# Patient Record
Sex: FEMALE | Race: WHITE | Employment: UNEMPLOYED | URBAN - METROPOLITAN AREA
[De-identification: names, ages, dates, MRNs, and addresses within clinical notes are randomized per-mention and may not be internally consistent; named-entity substitution may affect disease eponyms.]

---

## 2017-12-13 ENCOUNTER — OFFICE VISIT (OUTPATIENT)
Dept: OBGYN CLINIC | Age: 22
End: 2017-12-13

## 2017-12-13 VITALS
WEIGHT: 189.8 LBS | SYSTOLIC BLOOD PRESSURE: 120 MMHG | BODY MASS INDEX: 32.4 KG/M2 | DIASTOLIC BLOOD PRESSURE: 80 MMHG | HEIGHT: 64 IN

## 2017-12-13 DIAGNOSIS — R10.2 PELVIC PAIN: Primary | ICD-10-CM

## 2017-12-13 DIAGNOSIS — N83.201 CYST OF RIGHT OVARY: ICD-10-CM

## 2017-12-13 LAB
HCG URINE, QL. (POC): NEGATIVE
VALID INTERNAL CONTROL?: YES

## 2017-12-13 RX ORDER — IBUPROFEN 800 MG/1
800 TABLET ORAL
Qty: 60 TAB | Refills: 3 | Status: SHIPPED | OUTPATIENT
Start: 2017-12-13 | End: 2018-08-30 | Stop reason: ALTCHOICE

## 2017-12-13 RX ORDER — TRAMADOL HYDROCHLORIDE 50 MG/1
50 TABLET ORAL
Qty: 15 TAB | Refills: 0 | Status: SHIPPED | OUTPATIENT
Start: 2017-12-13 | End: 2018-08-30 | Stop reason: ALTCHOICE

## 2017-12-13 NOTE — PROGRESS NOTES
Pelvic Pain evaluation    Roxanna Reyes is a 23 y.o. female who complains of pelvic pain. The pain is described as pressure, and is 5/10 in intensity. Pain is located in the suprapubic without radiation. The pain started 3 days ago. Her symptoms have been unchanged since. Aggravating factors: none. Alleviating factors: none. Associated symptoms: severe pain with intercourse x 2 weeks ago. The patient denies nausea and vomiting. Pt is sexually active, uses the Rhythm method for birth control when needed, wants to conceive- pt unsure when cycle should start - possibility of pregnancy. No past medical history on file. No past surgical history on file. Social History     Occupational History    Not on file. Social History Main Topics    Smoking status: Not on file    Smokeless tobacco: Not on file    Alcohol use Not on file    Drug use: Not on file    Sexual activity: Not on file     No family history on file.     Allergies not on file  Prior to Admission medications    Not on File        Review of Systems: History obtained from the patient  Constitutional: negative for weight loss, fever, night sweats  Breast: negative for breast lumps, nipple discharge, galactorrhea  GI: negative for change in bowel habits, abdominal pain, black or bloody stools  : negative for frequency, dysuria, hematuria, vaginal discharge- pain 3 days ago debilitating per pt  MSK: negative for back pain, joint pain, muscle pain  Skin: negative for itching, rash, hives  Psych: negative for anxiety, depression, change in mood      Objective:      Physical Exam:     Constitutional  · Appearance: well-nourished, well developed, alert, no acute distress - guarding lower abd    Gastrointestinal  · Abdominal Examination: abdomen, no masses present- RLQ/symphis tenderness with palpation  · Liver and spleen: no hepatomegaly present, spleen not palpable  · Hernias: no hernias identified    Genitourinary  · External Genitalia: normal appearance for age, no discharge present, no tenderness present, no inflammatory lesions present, no masses present, no atrophy present  · Vagina: normal vaginal vault without central or paravaginal defects, no discharge present, no inflammatory lesions present, no masses present  · Bladder: non-tender to palpation  · Urethra: appears normal  · Cervix: mild/moderate CMT   · Uterus: normal size, shape and consistency  · Adnexa: 7/10 adnexal tenderness with bimanual  · Perineum: perineum within normal limits, no evidence of trauma, no rashes or skin lesions present  · Anus: anus within normal limits, no hemorrhoids present  · Inguinal Lymph Nodes: no lymphadenopathy present    Skin  · General Inspection: no rash, no lesions identified    Neurologic/Psychiatric  · Mental Status:  · Orientation: grossly oriented to person, place and time  · Mood and Affect: mood normal, affect appropriate    Pregnancy test in office negaitve    Sonogram -   Report per sono tech    THE UTERUS IS ANTEVERTED, NORMAL IN SIZE AND ECHOGENICITY. THE ENDOMETRIUM MEASURES 6-7MM IN THICKNESS. NO MASSES OR ABNORMALITIES ARE SEEN. RIGHT OVARY APPEARS WNL. THERE APPEARS TO BE A COLLAPSED FOLLICLE WITH BLOODFLOW IN THE  PERIPHERY. THIS MAY REPRESENT A COLLAPSED HEMORRHAGIC CYST. LEFT OVARY APPEARS WNL. NO FREE FLUID IS SEEN IN THE CDS.     Assessment/Plan:  Negative pregnancy test  Hemorraghic Right ovarian cyst- appears to be resolving -   Management options reviewed- BCP for ovulation control- pt would prefer not to start birth control, wanting to conceive  Conservative manamgement- motrin for discomfort- 15 tramadol given for severe pain- if pain perists, fever develops, or pain is recurrent pt will call back     Vlad Lawrence CNM

## 2017-12-13 NOTE — PATIENT INSTRUCTIONS
Pelvic Pain: Care Instructions  Your Care Instructions    Pelvic pain, or pain in the lower belly, can have many causes. Often pelvic pain is not serious and gets better in a few days. If your pain continues or gets worse, you may need tests and treatment. Tell your doctor about any new symptoms. These may be signs of a serious problem. Follow-up care is a key part of your treatment and safety. Be sure to make and go to all appointments, and call your doctor if you are having problems. It's also a good idea to know your test results and keep a list of the medicines you take. How can you care for yourself at home? · Rest until you feel better. Lie down, and raise your legs by placing a pillow under your knees. · Drink plenty of fluids. You may find that small, frequent sips are easier on your stomach than if you drink a lot at once. Avoid drinks with carbonation or caffeine, such as soda pop, tea, or coffee. · Try eating several small meals instead of 2 or 3 large ones. Eat mild foods, such as rice, dry toast or crackers, bananas, and applesauce. Avoid fatty and spicy foods, other fruits, and alcohol until 48 hours after your symptoms have gone away. · Take an over-the-counter pain medicine, such as acetaminophen (Tylenol), ibuprofen (Advil, Motrin), or naproxen (Aleve). Read and follow all instructions on the label. · Do not take two or more pain medicines at the same time unless the doctor told you to. Many pain medicines have acetaminophen, which is Tylenol. Too much acetaminophen (Tylenol) can be harmful. · You can put a heating pad, a warm cloth, or moist heat on your belly to relieve pain. When should you call for help? Call your doctor now or seek immediate medical care if:  · You have a new or higher fever. · You have unusual vaginal bleeding. · You have new or worse belly or pelvic pain. · You have vaginal discharge that has increased in amount or smells bad.   Watch closely for changes in your health, and be sure to contact your doctor if:  · You do not get better as expected. Where can you learn more? Go to http://fabiano-rkis.info/. Enter [phone] in the search box to learn more about \"Pelvic Pain: Care Instructions. \"  Current as of: October 13, 2016  Content Version: 11.4  © 6416-4451 [employer]. Care instructions adapted under license by [employer] (which disclaims liability or warranty for this information). If you have questions about a medical condition or this instruction, always ask your healthcare professional. [place] [address] any warranty or liability for your use of this information.

## 2018-04-04 NOTE — TELEPHONE ENCOUNTER
Attempted to contact patient regarding prescriptions. Rx Zofran 4 mg ODT #30, 1 RF and Diclegis #120, 1 RF unable to be sent to pharmacy. No pharmacy listed in chart. Medications have been approved by Armando Segura CNM. Unable to leave a message on voicemail, just states unavailable.

## 2018-04-09 ENCOUNTER — TELEPHONE (OUTPATIENT)
Dept: OBGYN CLINIC | Age: 23
End: 2018-04-09

## 2018-04-09 RX ORDER — DOXYLAMINE SUCCINATE AND PYRIDOXINE HYDROCHLORIDE, DELAYED RELEASE TABLETS 10 MG/10 MG 10; 10 MG/1; MG/1
2 TABLET, DELAYED RELEASE ORAL
Qty: 120 TAB | Refills: 0 | Status: SHIPPED | OUTPATIENT
Start: 2018-04-09 | End: 2018-08-30 | Stop reason: ALTCHOICE

## 2018-04-09 RX ORDER — ONDANSETRON 4 MG/1
4 TABLET, ORALLY DISINTEGRATING ORAL
Qty: 30 TAB | Refills: 0 | Status: SHIPPED | OUTPATIENT
Start: 2018-04-09 | End: 2018-05-29 | Stop reason: SDUPTHER

## 2018-04-09 NOTE — TELEPHONE ENCOUNTER
Patient is calling back because she did not get her rx sent to pharmacy as requested, apparently we did not have proper pharmacy information or her contact information. Attempted to contact patient regarding prescriptions. Rx Zofran 4 mg ODT #30, 1 RF and Diclegis #120, 1 RF unable to be sent to pharmacy. No pharmacy listed in chart. Medications have been approved by Effie Sloan CNM. Unable to leave a message on voicemail, just states unavailable. Patient is schedule for EOB 4-18-18 1:30 with [address] has been selected now in computer. I do not have specific directions given by [place] on how often to give. Please send in or verify for full directions. Thanks.

## 2018-04-09 NOTE — TELEPHONE ENCOUNTER
FRANKIE Weaver, LPN        Caller: Unspecified (Today, 12:52 PM)                     You can let her know I sent these to her Pharmacy. They should be ready by this afternoon.  Thanks, Davidson Rang            Previous Messages

## 2018-04-18 ENCOUNTER — TELEPHONE (OUTPATIENT)
Dept: OBGYN CLINIC | Age: 23
End: 2018-04-18

## 2018-04-18 RX ORDER — PROMETHAZINE HYDROCHLORIDE 12.5 MG/1
12.5 TABLET ORAL
Qty: 20 TAB | Refills: 0 | Status: SHIPPED | OUTPATIENT
Start: 2018-04-18 | End: 2018-08-30 | Stop reason: ALTCHOICE

## 2018-04-18 NOTE — TELEPHONE ENCOUNTER
Spoke with patient. Diclegis $40 and Zofran not working. Patient will call me back, walking and does not have a pen/paper to write dosing down for Unisom and B6. Can have Phenergan 12.5 mg  (cut in half first to start) for bedtime per Adis Flores CNM.

## 2018-04-18 NOTE — TELEPHONE ENCOUNTER
Patient calling back and requesting a return call back from [place].   Patient can be reached at [phone]

## 2018-04-18 NOTE — TELEPHONE ENCOUNTER
Spoke with patient. Rx Phenergan sent per Amy Horn CNM. Unisom and Vitamin B6 dosing given as well. Advised bland foods and hydration! The patient verbalized understanding.

## 2018-04-27 ENCOUNTER — OFFICE VISIT (OUTPATIENT)
Dept: OBGYN CLINIC | Age: 23
End: 2018-04-27

## 2018-04-27 VITALS
HEIGHT: 64 IN | BODY MASS INDEX: 29.74 KG/M2 | SYSTOLIC BLOOD PRESSURE: 120 MMHG | DIASTOLIC BLOOD PRESSURE: 76 MMHG | WEIGHT: 174.2 LBS

## 2018-04-27 DIAGNOSIS — Z3A.10 10 WEEKS GESTATION OF PREGNANCY: ICD-10-CM

## 2018-04-27 DIAGNOSIS — Z34.81 PRENATAL CARE, SUBSEQUENT PREGNANCY, FIRST TRIMESTER: Primary | ICD-10-CM

## 2018-04-27 LAB
CHLAMYDIA, EXTERNAL: NEGATIVE
N. GONORRHEA, EXTERNAL: NEGATIVE
PAP SMEAR, EXTERNAL: NORMAL
URINALYSIS, EXTERNAL: NEGATIVE

## 2018-04-27 NOTE — PROGRESS NOTES
Current pregnancy history:    Ann Marie Flowers is a  23 y.o. female [facility] No LMP recorded. .  She presents for the evaluation of amenorrhea and a positive pregnancy test.    LMP history:  The date of her LMP is  certain. Her last menstrual period was normal.  A urine pregnancy test was positive      Based on her LMP, her EDC is 18 and her EGA is 10 weeks,1 days. Her menstrual cycles are regular and occur approximately every 28 days  and range from 3 to 5 days. Ultrasound data:  She had an  ultrasound done by the ultrasound tech today which revealed a viable preciado pregnancy with a gestational age of 9 weeks and 6 days giving an [address] of 18. TA ULTRASOUND PERFORMED  A SINGLE VIABLE 10W6D WITH LOREN OF 2018 IUP IS SEEN WITH NORMAL CARDIAC RHYTHM. GESTATIONAL AGE BASED ON TODAYS ULTRASOUND. A NORMAL YOLK SAC IS SEEN. RIGHT OVARY APPEARS WITHIN NORMAL LIMITS. LEFT OVARY APPEARS WITHIN NORMAL LIMITS. NO FREE FLUID IS SEEN IN THE CDS    Pregnancy symptoms:    Since her LMP she has experienced daily, constant nausea. She has been vomiting over the last few weeks. Associated signs and symptoms which she denies: dysuria, discharge, vaginal bleeding. Relevant past pregnancy history:   She has the following pregnancy history: Her last pregnancy was  uncomplicated. She has no history of  delivery. Relevant past medical history:(relevant to this pregnancy): noncontributory. Pap/Occupational history:  Due for pap smear           Substance history: negative for alcohol, tobacco and street drugs. Positive for nothing. Exposure history: There is/are no indoor cat/s in the home. The patient was instructed to not change the cat litter. She admits close contact with children on a regular basis. She has had chicken pox or the vaccine in the past.   Patient denies issues with domestic violence.      Genetic Screening/Teratology Counseling: (Includes patient, baby's father, or anyone in either family with:)  3.  Patient's age >/= 28 at EDC?--no  2. Thalassemia ([facility] [place], [address], or  background): MCV<80?--no.     3.  Neural tube defect (meningomyelocele, spina bifida, anencephaly)?--no.   4.  Congenital heart defect?--no.  5.  Down syndrome?--no.   6.  Santana-Sachs (Denominational, Western [place]ame)?--no.   7.  Canavan's Disease?--no.   8.  Familial Dysautonomia?--no.   9.  Sickle cell disease or trait ()? --no   The patient has not been tested for sickle trait  10. Hemophilia or other blood disorders?--no. 11.  Muscular dystrophy?--no. 12.  Cystic fibrosis?--no. 13.  Garrard's Chorea?--no. 14.  Mental retardation/autism (if yes was person tested for Fragile X)?--no. 15.  Other inherited genetic or chromosomal disorder?--no. 12.  Maternal metabolic disorder (DM, PKU, etc)?--no. 17.  Patient or FOB with a child with a birth defect not listed above?--no.  17a. Patient or FOB with a birth defect themselves?--no. 18.  Recurrent pregnancy loss, or stillbirth?--no. 19.  Any medications since LMP other than prenatal vitamins (include vitamins, supplements, OTC meds, drugs, alcohol)?--no. 20.  Any other genetic/environmental exposure to discuss?--no. Infection History:  1. Lives with someone with TB or TB exposed?--no.   2.  Patient or partner has history of genital herpes?--no.  3.  Rash or viral illness since LMP?--no.    4.  History of STD (GC, CT, HPV, syphilis, HIV)? --no   5. Other: OTHER? No past medical history on file. Past Surgical History:   Procedure Laterality Date    HX TYMPANOSTOMY       Social History     Occupational History    Not on file.      Social History Main Topics    Smoking status: Never Smoker    Smokeless tobacco: Never Used    Alcohol use No    Drug use: No    Sexual activity: Yes     Partners: Male     Birth control/ protection: None     Family History   Problem Relation Age of Onset    Other Sister      Heart condition- 'hole' in heart    Colon Cancer Maternal Grandmother      OB History    Para Term  AB Living   2 2 2 0 0 4   SAB TAB Ectopic Molar Multiple Live Births   0 0 0 0 0 4      # Outcome Date GA Lbr Faraz/2nd Weight Sex Delivery Anes PTL Lv   2 Term 02/16/15 39w0d  8 lb 1 oz (3.657 kg) M Vag-Spont EPIDURAL AN  FARHANA   1 Term 13   9 lb 3 oz (4.167 kg) F Vag-Spont EPI N FARHANA        Allergies   Allergen Reactions    Ceftin [Cefuroxime Axetil] Hives     Prior to Admission medications    Medication Sig Start Date End Date Taking? Authorizing Provider   promethazine (PHENERGAN) 12.5 mg tablet Take 1 Tab by mouth every six (6) hours as needed for Nausea. 18   Dennie Hush, CNM   ondansetron (ZOFRAN ODT) 4 mg disintegrating tablet Take 1 Tab by mouth every eight (8) hours as needed for Nausea. Indications: EXCESSIVE VOMITING IN PREGNANCY 18   Wu Glass NP   doxylamine-pyridoxine, vit B6, (DICLEGIS) 10-10 mg TbEC DR tablet Take 2 Tabs by mouth nightly. Indications: NAUSEA GRAVIDARUM, May increase dose to 1 tab in AM and 1 tab in afternoon if 2 @ QS do not work 18   Wu Glass NP   traMADol (ULTRAM) 50 mg tablet Take 1 Tab by mouth every six (6) hours as needed for Pain. Max Daily Amount: 200 mg. 17   Dennie Hush, CNM   ibuprofen (MOTRIN) 800 mg tablet Take 1 Tab by mouth every six (6) hours as needed for Pain.  17   Dennie Hush, CNM        Review of Systems: History obtained from the patient  Constitutional: negative for weight loss, fever, night sweats  HEENT: negative for hearing loss, earache, congestion, snoring, sore throat  CV: negative for chest pain, palpitations, edema  Resp: negative for cough, shortness of breath, wheezing  Breast: negative for breast lumps, nipple discharge, galactorrhea  GI: negative for change in bowel habits, abdominal pain, black or bloody stools  : negative for frequency, dysuria, hematuria, vaginal discharge  MSK: negative for back pain, joint pain, muscle pain  Skin: negative for itching, rash, hives  Neuro: negative for dizziness, headache, confusion, weakness  Psych: negative for anxiety, depression, change in mood  Heme/lymph: negative for bleeding, bruising, pallor    Objective:  Visit Vitals    Ht 5' 4\" (1.626 m)       Physical Exam:     Constitutional  · Appearance: well-nourished, well developed, alert, in no acute distress    HENT  · Head  · Face: appears normal  · Eyes: appear normal  · Ears: normal  · Mouth: normal  · Lips: no lesions      Chest  · Respiratory Effort: breathing unlabored     Cardiovascular  · Heart:  · Auscultation: regular rate and rhythm without murmur      Gastrointestinal  · Abdominal Examination: abdomen non-tender to palpation, normal bowel sounds, no masses present  · Liver and spleen: no hepatomegaly present, spleen not palpable  · Hernias: no hernias identified    Genitourinary  · deferred    Skin  · General Inspection: no rash, no lesions identified    Neurologic/Psychiatric  · Mental Status:  · Orientation: grossly oriented to person, place and time  · Mood and Affect: mood normal, affect appropriate    Assessment:   Intrauterine pregnancy with issues addressed in problem list- LOREN 11/22/18  Plan:     Offered CF testing, CVS, Nuchal Translucency, MSAFP, amnio, and discussed NIPT- pt considering NIPT however wants to check with insurance for coverage- declines all other genetic testing   Course of pregnancy discussed including visit schedule, routine U/S, glucola testing, etc.  Avoid alcoholic beverages and illicit/recreational drugs use  Take prenatal vitamins or folic acid daily. Hospital and practice style discussed with coverage system. Discussed nutrition, toxoplasmosis precautions, sexual activity, exercise, need for influenza vaccine, environmental and work hazards, travel advice, screen for domestic violence, need for seat belts.   Discussed seafood, unpasteurized dairy products, deli meat, artificial sweeteners, and caffeine. Discussed current prescription drug use. Given medication list.  Discussed the use of over the counter medications and chemicals. Route of delivery discussed, including risks, benefits     Handouts given to pt.

## 2018-04-27 NOTE — PATIENT INSTRUCTIONS

## 2018-05-01 ENCOUNTER — TELEPHONE (OUTPATIENT)
Dept: OBGYN CLINIC | Age: 23
End: 2018-05-01

## 2018-05-01 NOTE — TELEPHONE ENCOUNTER
Patient calling stating that she needed CPT codes/procedural codes for the panorama testing to determine coverage. Patient advised that I do not have the codes and she can reach out to [facility] the rep for yessi and speak with her. The patient already had Reba's number and I confirmed it.

## 2018-05-02 LAB
BACTERIA UR CULT: NORMAL
C TRACH RRNA CVX QL NAA+PROBE: NEGATIVE
CYTOLOGIST CVX/VAG CYTO: NORMAL
CYTOLOGY CVX/VAG DOC THIN PREP: NORMAL
DX ICD CODE: NORMAL
LABCORP, 190119: NORMAL
Lab: NORMAL
Lab: NORMAL
N GONORRHOEA RRNA CVX QL NAA+PROBE: NEGATIVE
OTHER STN SPEC: NORMAL
PATH REPORT.FINAL DX SPEC: NORMAL
STAT OF ADQ CVX/VAG CYTO-IMP: NORMAL
T VAGINALIS RRNA SPEC QL NAA+PROBE: NEGATIVE

## 2018-05-10 DIAGNOSIS — Z3A.10 10 WEEKS GESTATION OF PREGNANCY: ICD-10-CM

## 2018-05-11 ENCOUNTER — TELEPHONE (OUTPATIENT)
Dept: OBGYN CLINIC | Age: 23
End: 2018-05-11

## 2018-05-11 NOTE — TELEPHONE ENCOUNTER
Patient spoke with Reba at [place] and is interested in pursuing the [facility]. She has the paperwork but it is not filled out. Patient said Chago Jessica told her to call the office to have the nurse fill it out and submit.

## 2018-05-14 ENCOUNTER — TELEPHONE (OUTPATIENT)
Dept: OBGYN CLINIC | Age: 23
End: 2018-05-14

## 2018-05-14 NOTE — TELEPHONE ENCOUNTER
Spoke with [address] @ [facility]. Per [address] patient requesting to go to [facility] in [place]. Called [facility] and spoke with [facility]. [facility] provided me with the fax number. Order faxed. Patient aware.

## 2018-05-14 NOTE — TELEPHONE ENCOUNTER
Spoke with the patient. Desires Panorama testing. W[facility] [place] lab [place] [place] of coming to the office. Patient does not know the labs fax number. Unable to fax order. Left message with Reba bourgeois [place].

## 2018-05-14 NOTE — TELEPHONE ENCOUNTER
Spoke with [facility] @ [employer] [facility]. Per [facility] patient requesting to go to [facility] in [facility]. Called [facility] and spoke with [facility]. [facility] provided me with the fax number. Order faxed. Patient aware.

## 2018-05-18 DIAGNOSIS — Z34.81 PRENATAL CARE, SUBSEQUENT PREGNANCY, FIRST TRIMESTER: Primary | ICD-10-CM

## 2018-05-22 NOTE — PROGRESS NOTES
Spoke with patient. Advised Yara Meredith is low risk. Patient requesting to put gender in envelope-- will  at appointment on Friday.

## 2018-05-25 ENCOUNTER — ROUTINE PRENATAL (OUTPATIENT)
Dept: OBGYN CLINIC | Age: 23
End: 2018-05-25

## 2018-05-25 VITALS — DIASTOLIC BLOOD PRESSURE: 70 MMHG | BODY MASS INDEX: 30.1 KG/M2 | SYSTOLIC BLOOD PRESSURE: 114 MMHG | WEIGHT: 175.38 LBS

## 2018-05-25 DIAGNOSIS — Z34.82 PRENATAL CARE, SUBSEQUENT PREGNANCY IN SECOND TRIMESTER: Primary | ICD-10-CM

## 2018-05-25 LAB
ANTIBODY SCREEN, EXTERNAL: NEGATIVE
HBSAG, EXTERNAL: NEGATIVE
HCT, EXTERNAL: 34
HGB EVAL, EXTERNAL: NORMAL
HGB, EXTERNAL: 11.2
HIV, EXTERNAL: NORMAL
PLATELET CNT,   EXTERNAL: 271
RUBELLA, EXTERNAL: NORMAL
T. PALLIDUM, EXTERNAL: NEGATIVE
TYPE, ABO & RH, EXTERNAL: NORMAL

## 2018-05-25 NOTE — PROGRESS NOTES
Patient states she is still having nausea, discussed alternative methods to relieve nausea, pt able to keep food and liquids down and is gaining weight appropriately. OB labs done today. Reviewed upcoming visits. Precautions given.

## 2018-05-27 LAB — BACTERIA UR CULT: NO GROWTH

## 2018-05-29 LAB
ABO GROUP BLD: NORMAL
BLD GP AB SCN SERPL QL: NEGATIVE
ERYTHROCYTE [DISTWIDTH] IN BLOOD BY AUTOMATED COUNT: 13.1 % (ref 12.3–15.4)
HBV SURFACE AG SERPL QL IA: NEGATIVE
HCT VFR BLD AUTO: 34 % (ref 34–46.6)
HGB A MFR BLD: 97.3 % (ref 96.4–98.8)
HGB A2 MFR BLD COLUMN CHROM: 2.7 % (ref 1.8–3.2)
HGB BLD-MCNC: 11.2 G/DL (ref 11.1–15.9)
HGB C MFR BLD: 0 %
HGB F MFR BLD: 0 % (ref 0–2)
HGB FRACT BLD-IMP: NORMAL
HGB OTHER MFR BLD HPLC: 0 %
HGB S BLD QL SOLY: NEGATIVE
HGB S MFR BLD: 0 %
HIV 1+2 AB+HIV1 P24 AG SERPL QL IA: NON REACTIVE
MCH RBC QN AUTO: 29.6 PG (ref 26.6–33)
MCHC RBC AUTO-ENTMCNC: 32.9 G/DL (ref 31.5–35.7)
MCV RBC AUTO: 90 FL (ref 79–97)
PLATELET # BLD AUTO: 271 X10E3/UL (ref 150–379)
RBC # BLD AUTO: 3.79 X10E6/UL (ref 3.77–5.28)
RH BLD: POSITIVE
RUBV IGG SERPL IA-ACNC: 1.98 INDEX
T PALLIDUM AB SER QL IA: NEGATIVE
WBC # BLD AUTO: 7.2 X10E3/UL (ref 3.4–10.8)

## 2018-05-30 RX ORDER — ONDANSETRON 4 MG/1
TABLET, ORALLY DISINTEGRATING ORAL
Qty: 30 TAB | Refills: 0 | Status: SHIPPED | OUTPATIENT
Start: 2018-05-30 | End: 2018-08-30 | Stop reason: ALTCHOICE

## 2018-06-06 ENCOUNTER — TELEPHONE (OUTPATIENT)
Dept: OBGYN CLINIC | Age: 23
End: 2018-06-06

## 2018-06-06 NOTE — TELEPHONE ENCOUNTER
Patient aware of Kateirna's recommendation. Patient is a little unhappy and states she will talk with Zack when she comes in for her next appt as she was told by Zack to go by the [address] [facility] LOREN.

## 2018-06-06 NOTE — TELEPHONE ENCOUNTER
Patient calling , stating that she was advised at her last appt on 2018 that her due is wrong and should be 2018 and would be fixed in the system. According to the ultrasound, her due date is 2018, but the first note states 2018 but did not include a LMP. Patient has scheduled her next appt for 2018 and was told this would include and US for her 20 wk scan. She was also told today but our appt secretaries that she cannot schedule an US for that day because it will be too early. If LOREN is 2018, then I can add an US if available, if LOREN is 2018, then she will be too early. Please advise - let me know if you have any questions.

## 2018-06-06 NOTE — TELEPHONE ENCOUNTER
Shawna Moulton, FRANKIE Barber, MATA        Caller: Unspecified (Today,  2:04 PM)                     Based on ACOG standards her LOREN is 11/22/18.  Thanks, Ruthie Emanuel

## 2018-06-13 ENCOUNTER — TELEPHONE (OUTPATIENT)
Dept: OBGYN CLINIC | Age: 23
End: 2018-06-13

## 2018-06-13 NOTE — TELEPHONE ENCOUNTER
Patient calling Nick Chavezcara pregnant, C/O of having a cold and wanted to know what OTC medications she could take. Patient given OTC safe medications from the [facility] list of approved medications to help. Patient encouraged that if her symptoms do not improve, she should seek evaluation with PCP or urgent care. Patient verbalized understanding.

## 2018-06-25 ENCOUNTER — ROUTINE PRENATAL (OUTPATIENT)
Dept: OBGYN CLINIC | Age: 23
End: 2018-06-25

## 2018-06-25 VITALS
BODY MASS INDEX: 30.25 KG/M2 | WEIGHT: 177.2 LBS | HEIGHT: 64 IN | SYSTOLIC BLOOD PRESSURE: 116 MMHG | DIASTOLIC BLOOD PRESSURE: 72 MMHG

## 2018-06-25 DIAGNOSIS — Z3A.10 10 WEEKS GESTATION OF PREGNANCY: ICD-10-CM

## 2018-06-25 RX ORDER — SERTRALINE HYDROCHLORIDE 50 MG/1
TABLET, FILM COATED ORAL DAILY
COMMUNITY
End: 2019-04-29 | Stop reason: SDUPTHER

## 2018-06-25 RX ORDER — LORATADINE 10 MG/1
10 TABLET ORAL
COMMUNITY
End: 2018-11-07

## 2018-06-25 NOTE — PATIENT INSTRUCTIONS

## 2018-07-13 ENCOUNTER — ROUTINE PRENATAL (OUTPATIENT)
Dept: OBGYN CLINIC | Age: 23
End: 2018-07-13

## 2018-07-13 VITALS
BODY MASS INDEX: 20.04 KG/M2 | HEIGHT: 64 IN | WEIGHT: 117.4 LBS | SYSTOLIC BLOOD PRESSURE: 110 MMHG | DIASTOLIC BLOOD PRESSURE: 64 MMHG

## 2018-07-13 DIAGNOSIS — Z3A.21 21 WEEKS GESTATION OF PREGNANCY: Primary | ICD-10-CM

## 2018-07-13 NOTE — PROGRESS NOTES
FETAL SURVEY  A SINGLE VIABLE IUP AT 21W1D IS SEEN. FETAL CARDIAC MOTION OBSERVED. FETAL ANATOMY WAS WELL VISUALIZED AND APPEARS WNL. NO ABNORMALITIES WERE SEEN ON TODAYS EXAM.  APPROPRIATE GROWTH MEASURED. SIZE = DATES. MARIA E AND CERVIX APPEAR WITHIN NORMAL LIMITS. GENDER: MALE  CORD INSERTION INTO PLACENTA APPEARS MARGINAL. THERE APPEARS TO BE HYPERECHOIC AREA WITHIN THE LEFT VENTRICLE OF THE FETAL HEART, POSSIBLE  LEFT ECHOGENIC FOCI.

## 2018-07-13 NOTE — PATIENT INSTRUCTIONS

## 2018-08-08 ENCOUNTER — ROUTINE PRENATAL (OUTPATIENT)
Dept: OBGYN CLINIC | Age: 23
End: 2018-08-08

## 2018-08-08 VITALS
SYSTOLIC BLOOD PRESSURE: 120 MMHG | WEIGHT: 184 LBS | HEIGHT: 64 IN | BODY MASS INDEX: 31.41 KG/M2 | DIASTOLIC BLOOD PRESSURE: 70 MMHG

## 2018-08-08 DIAGNOSIS — Z34.82 PRENATAL CARE, SUBSEQUENT PREGNANCY IN SECOND TRIMESTER: Primary | ICD-10-CM

## 2018-08-08 NOTE — PROGRESS NOTES
Weight last visit was documented incorrectly  Glucola next visit  F/u growth for marginal cord insertion next visit

## 2018-08-08 NOTE — PATIENT INSTRUCTIONS
Weeks 22 to 26 of Your Pregnancy: Care Instructions  Your Care Instructions    As you enter your 7th month of pregnancy at week 26, your baby's lungs are growing stronger and getting ready to breathe. You may notice that your baby responds to the sound of your or your partner's voice. You may also notice that your baby does less turning and twisting and more squirming or jerking. Jerking often means that your baby has the hiccups. Hiccups are perfectly normal and are only temporary. You may want to think about attending a childbirth preparation class. This is also a good time to start thinking about whether you want to have pain medicine during labor. Most pregnant women are tested for gestational diabetes between weeks 25 and 28. Gestational diabetes occurs when your blood sugar level gets too high when you're pregnant. The test is important, because you can have gestational diabetes and not know it. But the condition can cause problems for your baby. Follow-up care is a key part of your treatment and safety. Be sure to make and go to all appointments, and call your doctor if you are having problems. It's also a good idea to know your test results and keep a list of the medicines you take. How can you care for yourself at home? Ease discomfort from your baby's kicking  · Change your position. Sometimes this will cause your baby to change position too. · Take a deep breath while you raise your arm over your head. Then breathe out while you drop your arm. Do Kegel exercises to prevent urine from leaking  · You can do Kegel exercises while you stand or sit. ¨ Squeeze the same muscles you would use to stop your urine. Your belly and thighs should not move. ¨ Hold the squeeze for 3 seconds, and then relax for 3 seconds. ¨ Start with 3 seconds. Then add 1 second each week until you are able to squeeze for 10 seconds. ¨ Repeat the exercise 10 to 15 times for each session.  Do three or more sessions each day.  Ease or reduce swelling in your feet, ankles, hands, and fingers  · If your fingers are puffy, take off your rings. · Do not eat high-salt foods, such as potato chips. · Prop up your feet on a stool or couch as much as possible. Sleep with pillows under your feet. · Do not stand for long periods of time or wear tight shoes. · Wear support stockings. Where can you learn more? Go to http://fabiano-kris.info/. Enter G264 in the search box to learn more about \"Weeks 22 to 26 of Your Pregnancy: Care Instructions. \"  Current as of: November 21, 2017  Content Version: 11.7  © 4985-5046 [employer]. Care instructions adapted under license by [employer] (which disclaims liability or warranty for this information). If you have questions about a medical condition or this instruction, always ask your healthcare professional. [place] [address] any warranty or liability for your use of this information.

## 2018-08-30 ENCOUNTER — ROUTINE PRENATAL (OUTPATIENT)
Dept: OBGYN CLINIC | Age: 23
End: 2018-08-30

## 2018-08-30 VITALS
BODY MASS INDEX: 32.27 KG/M2 | DIASTOLIC BLOOD PRESSURE: 78 MMHG | WEIGHT: 189 LBS | SYSTOLIC BLOOD PRESSURE: 120 MMHG | HEIGHT: 64 IN

## 2018-08-30 DIAGNOSIS — Z34.83 PRENATAL CARE, SUBSEQUENT PREGNANCY, THIRD TRIMESTER: Primary | ICD-10-CM

## 2018-08-30 DIAGNOSIS — Z3A.10 10 WEEKS GESTATION OF PREGNANCY: ICD-10-CM

## 2018-08-30 LAB
ANTIBODY SCREEN, EXTERNAL: NEGATIVE
GTT, 1 HR, GLUCOLA, EXTERNAL: 109
HCT, EXTERNAL: 31.4
HGB, EXTERNAL: 10.3
PLATELET CNT,   EXTERNAL: 253
T. PALLIDUM, EXTERNAL: NORMAL

## 2018-08-30 NOTE — PATIENT INSTRUCTIONS
Weeks 26 to 30 of Your Pregnancy: Care Instructions  Your Care Instructions    You are now in your last trimester of pregnancy. Your baby is growing rapidly. And you'll probably feel your baby moving around more often. Your doctor may ask you to count your baby's kicks. Your back may ache as your body gets used to your baby's size and length. If you haven't already had the Tdap shot during this pregnancy, talk to your doctor about getting it. It will help protect your  against pertussis infection. During this time, it's important to take care of yourself and pay attention to what your body needs. If you feel sexual, explore ways to be close with your partner that match your comfort and desire. Use the tips provided in this care sheet to find ways to be sexual in your own way. Follow-up care is a key part of your treatment and safety. Be sure to make and go to all appointments, and call your doctor if you are having problems. It's also a good idea to know your test results and keep a list of the medicines you take. How can you care for yourself at home? Take it easy at work  · Take frequent breaks. If possible, stop working when you are tired, and rest during your lunch hour. · Take bathroom breaks every 2 hours. · Change positions often. If you sit for long periods, stand up and walk around. · When you stand for a long time, keep one foot on a low stool with your knee bent. After standing a lot, sit with your feet up. · Avoid fumes, chemicals, and tobacco smoke. Be sexual in your own way  · Having sex during pregnancy is okay, unless your doctor tells you not to. · You may be very interested in sex, or you may have no interest at all. · Your growing belly can make it hard to find a good position during intercourse. [place] and explore. · You may get cramps in your uterus when your partner touches your breasts.   · A back rub may relieve the backache or cramps that sometimes follow orgasm. Learn about  labor  · Watch for signs of  labor. You may be going into labor if:  ¨ You have menstrual-like cramps, with or without nausea. ¨ You have about 6 or more contractions in 1 hour, even after you have had a glass of water and are resting. ¨ You have a low, dull backache that does not go away when you change your position. ¨ You have pain or pressure in your pelvis that comes and goes in a pattern. ¨ You have intestinal cramping or flu-like symptoms, with or without diarrhea. ¨ You notice an increase or change in your vaginal discharge. Discharge may be heavy, mucus-like, watery, or streaked with blood. ¨ Your water breaks. · If you think you have  labor:  ¨ Drink 2 or 3 glasses of water or juice. Not drinking enough fluids can cause contractions. ¨ Stop what you are doing, and empty your bladder. Then lie down on your left side for at least 1 hour. ¨ While lying on your side, find your breast bone. Put your fingers in the soft spot just below it. Move your fingers down toward your belly button to find the top of your uterus. Check to see if it is tight. ¨ Contractions can be weak or strong. Record your contractions for an hour. Time a contraction from the start of one contraction to the start of the next one. ¨ Single or several strong contractions without a pattern are called [place] contractions. They are practice contractions but not the start of labor. They often stop if you change what you are doing. ¨ Call your doctor if you have regular contractions. Where can you learn more? Go to http://fabiano-kris.info/. Enter N980 in the search box to learn more about \"Weeks 26 to 30 of Your Pregnancy: Care Instructions. \"  Current as of: 2017  Content Version: 11.7  © 8637-6231 [employer]. Care instructions adapted under license by [employer] (which disclaims liability or warranty for this information). If you have questions about a medical condition or this instruction, always ask your healthcare professional. [place] [address] any warranty or liability for your use of this information.

## 2018-08-30 NOTE — PROGRESS NOTES
Glucola today    LIMITED OB SCAN  A SINGLE VERTEX 28W0D IUP IS SEEN. FETAL CARDIAC MOTION OBSERVED. LIMITED ANATOMY WAS VISUALIZED AND APPEARS WNL. SIZE >DATES WITH FETAL MEASUREMENTS AT 29W6D ON TODAYS STUDY. MARIA E AND PLACENTA APPEAR WITHIN NORMAL LIMITS. MARGINAL CORD INSERTION IS VISUALIZED.

## 2018-09-01 LAB
BASOPHILS # BLD AUTO: 0 X10E3/UL (ref 0–0.2)
BASOPHILS NFR BLD AUTO: 0 %
BLD GP AB SCN SERPL QL: NEGATIVE
EOSINOPHIL # BLD AUTO: 0 X10E3/UL (ref 0–0.4)
EOSINOPHIL NFR BLD AUTO: 0 %
ERYTHROCYTE [DISTWIDTH] IN BLOOD BY AUTOMATED COUNT: 13.5 % (ref 12.3–15.4)
GLUCOSE 1H P 50 G GLC PO SERPL-MCNC: 109 MG/DL (ref 65–139)
HCT VFR BLD AUTO: 31.4 % (ref 34–46.6)
HGB BLD-MCNC: 10.3 G/DL (ref 11.1–15.9)
IMM GRANULOCYTES # BLD: 0 X10E3/UL (ref 0–0.1)
IMM GRANULOCYTES NFR BLD: 0 %
LYMPHOCYTES # BLD AUTO: 1.4 X10E3/UL (ref 0.7–3.1)
LYMPHOCYTES NFR BLD AUTO: 18 %
MCH RBC QN AUTO: 28.5 PG (ref 26.6–33)
MCHC RBC AUTO-ENTMCNC: 32.8 G/DL (ref 31.5–35.7)
MCV RBC AUTO: 87 FL (ref 79–97)
MONOCYTES # BLD AUTO: 0.4 X10E3/UL (ref 0.1–0.9)
MONOCYTES NFR BLD AUTO: 5 %
NEUTROPHILS # BLD AUTO: 5.8 X10E3/UL (ref 1.4–7)
NEUTROPHILS NFR BLD AUTO: 77 %
PLATELET # BLD AUTO: 253 X10E3/UL (ref 150–379)
RBC # BLD AUTO: 3.61 X10E6/UL (ref 3.77–5.28)
T PALLIDUM AB SER QL IA: NEGATIVE
WBC # BLD AUTO: 7.6 X10E3/UL (ref 3.4–10.8)

## 2018-09-19 ENCOUNTER — ROUTINE PRENATAL (OUTPATIENT)
Dept: OBGYN CLINIC | Age: 23
End: 2018-09-19

## 2018-09-19 VITALS — WEIGHT: 187.8 LBS | DIASTOLIC BLOOD PRESSURE: 70 MMHG | BODY MASS INDEX: 32.24 KG/M2 | SYSTOLIC BLOOD PRESSURE: 112 MMHG

## 2018-09-19 DIAGNOSIS — Z3A.10 10 WEEKS GESTATION OF PREGNANCY: ICD-10-CM

## 2018-09-19 NOTE — PROGRESS NOTES
Reports feeling lots of pressure - reviewed normal versus abnormal complaints of pregnancy, PTL concerns, eloy jackson. Urine is dark and pt  states she drinks muliple sodas daily and minimal water- reviewed hydration, caffiene as a diuretic- and concerns of dehydration in pregnancy.    Declines Tdap and flu

## 2018-09-19 NOTE — PATIENT INSTRUCTIONS
Weeks 30 to 32 of Your Pregnancy: Care Instructions  Your Care Instructions    You have made it to the final months of your pregnancy. By now, your baby is really starting to look like a baby, with hair and plump skin. As you enter the final weeks of pregnancy, the reality of having a baby may start to set in. This is the time to settle on a name, get your household in order, set up a safe nursery, and find quality  if needed. Doing these things in advance will allow you to focus on caring for and enjoying your new baby. You may also want to have a tour of your hospital's labor and delivery unit to get a better idea of what to expect while you are in the hospital.  During these last months, it is very important to take good care of yourself and pay attention to what your body needs. If your doctor says it is okay for you to work, don't push yourself too hard. Use the tips provided in this care sheet to ease heartburn and care for varicose veins. If you haven't already had the Tdap shot during this pregnancy, talk to your doctor about getting it. It will help protect your  against pertussis infection. Follow-up care is a key part of your treatment and safety. Be sure to make and go to all appointments, and call your doctor if you are having problems. It's also a good idea to know your test results and keep a list of the medicines you take. How can you care for yourself at home? Pay attention to your baby's movements  · You should feel your baby move several times every day. · Your baby now turns less, and kicks and jabs more. · Your baby sleeps 20 to 45 minutes at a time and is more active at certain times of day. · If your doctor wants you to count your baby's kicks:  ¨ Empty your bladder, and lie on your side or relax in a comfortable chair. ¨ Write down your start time. ¨ Pay attention only to your baby's movements. Count any movement except hiccups.   ¨ After you have counted 10 movements, write down your stop time. ¨ Write down how many minutes it took for your baby to move 10 times. ¨ If an hour goes by and you have not recorded 10 movements, have something to eat or drink and then count for another hour. If you do not record 10 movements in either hour, call your doctor. Ease heartburn  · Eat small, frequent meals. · Do not eat chocolate, peppermint, or very spicy foods. Avoid drinks with caffeine, such as coffee, tea, and sodas. · Avoid bending over or lying down after meals. · Talk a short walk after you eat. · If heartburn is a problem at night, do not eat for 2 hours before bedtime. · Take antacids like Mylanta, Maalox, Rolaids, or Tums. Do not take antacids that have sodium bicarbonate. Care for varicose veins  · Varicose veins are blood vessels that stretch out with the extra blood during pregnancy. Your legs may ache or throb. Most varicose veins will go away after the birth. · Avoid standing for long periods of time. Sit with your legs crossed at the ankles, not the knees. · Sit with your feet propped up. · Avoid tight clothing or stockings. Wear support hose. · Exercise regularly. Try walking for at least 30 minutes a day. Where can you learn more? Go to http://fabiano-kris.info/. Enter R251 in the search box to learn more about \"Weeks 30 to 32 of Your Pregnancy: Care Instructions. \"  Current as of: November 21, 2017  Content Version: 11.7  © 5622-2570 [employer]. Care instructions adapted under license by [employer] (which disclaims liability or warranty for this information). If you have questions about a medical condition or this instruction, always ask your healthcare professional. [place] [address] any warranty or liability for your use of this information.

## 2018-10-01 ENCOUNTER — ROUTINE PRENATAL (OUTPATIENT)
Dept: OBGYN CLINIC | Age: 23
End: 2018-10-01

## 2018-10-01 ENCOUNTER — OFFICE VISIT (OUTPATIENT)
Dept: OBGYN CLINIC | Age: 23
End: 2018-10-01

## 2018-10-01 VITALS
BODY MASS INDEX: 32.58 KG/M2 | HEIGHT: 64 IN | WEIGHT: 190.8 LBS | SYSTOLIC BLOOD PRESSURE: 108 MMHG | DIASTOLIC BLOOD PRESSURE: 70 MMHG

## 2018-10-01 DIAGNOSIS — Z3A.10 10 WEEKS GESTATION OF PREGNANCY: ICD-10-CM

## 2018-10-01 NOTE — PATIENT INSTRUCTIONS
Weeks 32 to 34 of Your Pregnancy: Care Instructions  Your Care Instructions    During the last few weeks of your pregnancy, you may have more aches and pains. It's important to rest when you can. Your growing baby is putting more pressure on your bladder. So you may need to urinate more often. Hemorrhoids are also common. These are painful, itchy veins in the rectal area. In the 36th week, most women have a test for group B streptococcus (GBS). GBS is a common bacteria that can live in the vagina and rectum. It can make your baby sick after birth. If you test positive, you will get antibiotics during labor. These will keep your baby from getting the bacteria. You may want to talk with your doctor about banking your baby's umbilical cord blood. This is the blood left in the cord after birth. If you want to save this blood, you must arrange it ahead of time. You can't decide at the last minute. If you haven't already had the Tdap shot during this pregnancy, talk to your doctor about getting it. It will help protect your  against pertussis infection. Follow-up care is a key part of your treatment and safety. Be sure to make and go to all appointments, and call your doctor if you are having problems. It's also a good idea to know your test results and keep a list of the medicines you take. How can you care for yourself at home? Ease hemorrhoids  · Get more liquids, fruits, vegetables, and fiber in your diet. This will help keep your stools soft. · Avoid sitting for too long. Lie on your left side several times a day. · Clean yourself with soft, moist toilet paper. Or you can use witch hazel pads or personal hygiene pads. · If you are uncomfortable, try ice packs. Or you can sit in a warm sitz bath. Do these for 20 minutes at a time, as needed. · Use hydrocortisone cream for pain and itching. Two examples are Anusol and Preparation H Hydrocortisone.   · Ask your doctor about taking an over-the-counter stool softener. Consider breastfeeding  · Experts recommend that women breastfeed for 1 year or longer. Breast milk is the perfect food for babies. · Breast milk is easier for babies to digest than formula. And it is always available, just the right temperature, and free. · Breast milk may help protect your child from some health problems.  babies are less likely than formula-fed babies to:  ¨ Get ear infections, colds, diarrhea, and pneumonia. ¨ Be obese or get diabetes later in life. · Women who breastfeed have less bleeding after the birth. Their uteruses also shrink back faster. · Some women who breastfeed lose weight faster. Making milk burns calories. · Breastfeeding can lower your risk of breast cancer, ovarian cancer, and osteoporosis. Decide about circumcision for boys  · As you make this decision, it may help to think about your personal, Hoahaoism, and family traditions. You get to decide if you will keep your son's penis natural or if he will be circumcised. · If you decide that you would like to have your baby circumcised, talk with your doctor. You can share your concerns about pain. And you can discuss your preferences for anesthesia. Where can you learn more? Go to http://fabiano-kris.info/. Enter E860 in the search box to learn more about \"Weeks 32 to 34 of Your Pregnancy: Care Instructions. \"  Current as of: November 21, 2017  Content Version: 11.7  © 6178-2432 [employer]. Care instructions adapted under license by [employer] (which disclaims liability or warranty for this information). If you have questions about a medical condition or this instruction, always ask your healthcare professional. [place] [address] any warranty or liability for your use of this information.

## 2018-10-01 NOTE — PROGRESS NOTES
LIMITED OB SCAN  A SINGLE VERTEX 32W4D IUP IS SEEN. FETAL CARDIAC MOTION OBSERVED. LIMITED ANATOMY WAS VISUALIZED AND APPEARS WNL. APPROPRIATE GROWTH MEASURED. SIZE = DATES. PLACENTA APPEAR WITHIN NORMAL LIMITS. MARIA E APPEARS POLYHYDRAMNIOS MEASURING 25 CM.   BPP=8/8  Hx LGA babies- 72% for growth,

## 2018-10-15 ENCOUNTER — ROUTINE PRENATAL (OUTPATIENT)
Dept: OBGYN CLINIC | Age: 23
End: 2018-10-15

## 2018-10-15 VITALS
DIASTOLIC BLOOD PRESSURE: 64 MMHG | WEIGHT: 192.4 LBS | BODY MASS INDEX: 32.85 KG/M2 | SYSTOLIC BLOOD PRESSURE: 102 MMHG | HEIGHT: 64 IN

## 2018-10-15 DIAGNOSIS — Z34.83 PRENATAL CARE, SUBSEQUENT PREGNANCY IN THIRD TRIMESTER: Primary | ICD-10-CM

## 2018-10-15 DIAGNOSIS — O36.63X0 EXCESSIVE FETAL GROWTH AFFECTING MANAGEMENT OF PREGNANCY IN THIRD TRIMESTER, SINGLE OR UNSPECIFIED FETUS: ICD-10-CM

## 2018-10-15 NOTE — PATIENT INSTRUCTIONS

## 2018-10-29 ENCOUNTER — ROUTINE PRENATAL (OUTPATIENT)
Dept: OBGYN CLINIC | Age: 23
End: 2018-10-29

## 2018-10-29 VITALS — BODY MASS INDEX: 33.09 KG/M2 | SYSTOLIC BLOOD PRESSURE: 116 MMHG | DIASTOLIC BLOOD PRESSURE: 80 MMHG | WEIGHT: 192.8 LBS

## 2018-10-29 DIAGNOSIS — Z34.83 PRENATAL CARE, SUBSEQUENT PREGNANCY IN THIRD TRIMESTER: Primary | ICD-10-CM

## 2018-10-29 DIAGNOSIS — O40.3XX0 POLYHYDRAMNIOS AFFECTING PREGNANCY IN THIRD TRIMESTER: ICD-10-CM

## 2018-10-29 LAB — GRBS, EXTERNAL: NEGATIVE

## 2018-10-29 NOTE — PROGRESS NOTES
GBS today, VE 3-4/50 ballotable. Denies VB/LOF. Labor precautions given.  Follow-up Monday morning to repeat exam.  Pressure  BPP 8/8

## 2018-10-29 NOTE — PATIENT INSTRUCTIONS
Weeks 34 to 36 of Your Pregnancy: Care Instructions  Your Care Instructions    By now, your baby and your belly have grown quite large. It is almost time to give birth. A full-term pregnancy can deliver between 37 and 42 weeks. Your baby's lungs are almost ready to breathe air. The bones in your baby's head are now firm enough to protect it, but soft enough to move down through the birth canal.  You may feel excited, happy, anxious, or scared. You may wonder how you will know if you are in labor or what to expect during labor. Try to be flexible in your expectations of the birth. Because each birth is different, there is no way to know exactly what childbirth will be like for you. This care sheet will help you know what to expect and how to prepare. This may make your childbirth easier. If you haven't already had the Tdap shot during this pregnancy, talk to your doctor about getting it. It will help protect your  against pertussis infection. In the 36th week, most women have a test for group B streptococcus (GBS). GBS is a common bacteria that can live in the vagina and rectum. It can make your baby sick after birth. If you test positive, you will get antibiotics during labor. The medicine will keep your baby from getting the bacteria. Follow-up care is a key part of your treatment and safety. Be sure to make and go to all appointments, and call your doctor if you are having problems. It's also a good idea to know your test results and keep a list of the medicines you take. How can you care for yourself at home? Learn about pain relief choices  · Pain is different for every woman. Talk with your doctor about your feelings about pain. · You can choose from several types of pain relief. These include medicine or breathing techniques, as well as comfort measures. You can use more than one option. · If you choose to have pain medicine during labor, talk to your doctor about your options.  Some medicines lower anxiety and help with some of the pain. Others make your lower body numb so that you won't feel pain. · Be sure to tell your doctor about your pain medicine choice before you start labor or very early in your labor. You may be able to change your mind as labor progresses. · Rarely, a woman is put to sleep by medicine given through a mask or an IV. Labor and delivery  · The first stage of labor has three parts: early, active, and transition. ? Most women have early labor at home. You can stay busy or rest, eat light snacks, drink clear fluids, and start counting contractions. ? When talking during a contraction gets hard, you may be moving to active labor. During active labor, you should head for the hospital if you are not there already. ? You are in active labor when contractions come every 3 to 4 minutes and last about 60 seconds. Your cervix is opening more rapidly. ? If your water breaks, contractions will come faster and stronger. ? During transition, your cervix is stretching, and contractions are coming more rapidly. ? You may want to push, but your cervix might not be ready. Your doctor will tell you when to push. · The second stage starts when your cervix is completely opened and you are ready to push. ? Contractions are very strong to push the baby down the birth canal.  ? You will feel the urge to push. You may feel like you need to have a bowel movement. ? You may be coached to push with contractions. These contractions will be very strong, but you will not have them as often. You can get a little rest between contractions. ? You may be emotional and irritable. You may not be aware of what is going on around you.  ? One last push, and your baby is born. · The third stage is when a few more contractions push out the placenta. This may take 30 minutes or less. · The fourth stage is the welcome recovery. You may feel overwhelmed with emotions and exhausted but alert.  This is a good time to start breastfeeding. Where can you learn more? Go to http://fabiano-kris.info/. Enter A679 in the search box to learn more about \"Weeks 34 to 36 of Your Pregnancy: Care Instructions. \"  Current as of: November 21, 2017  Content Version: 11.8  © 5540-6338 [employer]. Care instructions adapted under license by [employer] (which disclaims liability or warranty for this information). If you have questions about a medical condition or this instruction, always ask your healthcare professional. [address] any warranty or liability for your use of this information.

## 2018-10-31 LAB — GP B STREP DNA SPEC QL NAA+PROBE: NEGATIVE

## 2018-11-02 ENCOUNTER — HOSPITAL ENCOUNTER (EMERGENCY)
Age: 23
Discharge: HOME | End: 2018-11-02
Attending: OBSTETRICS & GYNECOLOGY | Admitting: OBSTETRICS & GYNECOLOGY
Payer: COMMERCIAL

## 2018-11-02 ENCOUNTER — TELEPHONE (OUTPATIENT)
Dept: OBGYN CLINIC | Age: 23
End: 2018-11-02

## 2018-11-02 VITALS
HEART RATE: 112 BPM | SYSTOLIC BLOOD PRESSURE: 123 MMHG | TEMPERATURE: 97.7 F | HEIGHT: 64 IN | DIASTOLIC BLOOD PRESSURE: 71 MMHG | WEIGHT: 192 LBS | BODY MASS INDEX: 32.78 KG/M2 | RESPIRATION RATE: 14 BRPM

## 2018-11-02 DIAGNOSIS — Z3A.10 10 WEEKS GESTATION OF PREGNANCY: ICD-10-CM

## 2018-11-02 LAB
APPEARANCE UR: ABNORMAL
BACTERIA URNS QL MICRO: NEGATIVE /HPF
BILIRUB UR QL: NEGATIVE
COLOR UR: ABNORMAL
EPITH CASTS URNS QL MICRO: ABNORMAL /LPF
GLUCOSE UR STRIP.AUTO-MCNC: NEGATIVE MG/DL
HGB UR QL STRIP: NEGATIVE
KETONES UR QL STRIP.AUTO: NEGATIVE MG/DL
LEUKOCYTE ESTERASE UR QL STRIP.AUTO: ABNORMAL
NITRITE UR QL STRIP.AUTO: NEGATIVE
PH UR STRIP: 6.5 [PH] (ref 5–8)
PROT UR STRIP-MCNC: NEGATIVE MG/DL
RBC #/AREA URNS HPF: ABNORMAL /HPF (ref 0–5)
SP GR UR REFRACTOMETRY: 1.01 (ref 1–1.03)
UA: UC IF INDICATED,UAUC: ABNORMAL
UROBILINOGEN UR QL STRIP.AUTO: 0.2 EU/DL (ref 0.2–1)
WBC URNS QL MICRO: ABNORMAL /HPF (ref 0–4)
YEAST BUDDING URNS QL: PRESENT

## 2018-11-02 PROCEDURE — 87086 URINE CULTURE/COLONY COUNT: CPT | Performed by: OBSTETRICS & GYNECOLOGY

## 2018-11-02 PROCEDURE — 59025 FETAL NON-STRESS TEST: CPT

## 2018-11-02 PROCEDURE — 99285 EMERGENCY DEPT VISIT HI MDM: CPT

## 2018-11-02 PROCEDURE — 81001 URINALYSIS AUTO W/SCOPE: CPT | Performed by: OBSTETRICS & GYNECOLOGY

## 2018-11-02 NOTE — H&P
History & Physical    Name: Eric Velasquez MRN: [id number]  SSN: [id number]    YOB: 1995  Age: 20 y.o. Sex: female        Ruben Hernandez U. 7. with complaint of contractions for greater than 12 hours. She denies LOF, vaginal bleeding or concerns with her baby's movements     Estimated Date of Delivery: 18  OB History      Para Term  AB Living    3 2 2 0 0 4    SAB TAB Ectopic Molar Multiple Live Births    0 0 0 0 0 4          MsTaiwo Huerta is being observed with contractions at 37w1d for  Prenatal course was normal. Please see prenatal records for details. Patient Active Problem List    Diagnosis    10 weeks gestation of pregnancy     Primary Provider: Adria    EDC by lmp  Pertinents:  HX LGA babies 9+ pounds    IOB labs: pap NIL 18  Genetic Screening: Panorama- male low risk  Anatomy: FETAL ANATOMY WAS WELL VISUALIZED AND APPEARS WNL. NO ABNORMALITIES WERE SEEN ON TODAYS EXAM.  APPROPRIATE GROWTH MEASURED. SIZE = DATES. MARIA E AND CERVIX APPEAR WITHIN NORMAL LIMITS. GENDER: MALE  CORD INSERTION INTO PLACENTA APPEARS MARGINAL. THERE APPEARS TO BE HYPERECHOIC AREA WITHIN THE LEFT VENTRICLE OF THE FETAL HEART, POSSIBLE  LEFT ECHOGENIC FOCI; fu US every 4 weeks    Measuring 72% 10/1/18- hx large baby  GTT: 28 weeks 109  Flu: declined  18  TDAP: declined 18  Rhogam: n/a  Third Tri Labs: Hgb 10.3  GBS: Negative    Pain mgmt. in labor: epidural   Feeding: breast  Circ:  Social: Lives in [facility] [place]         No specialty comments available.   Past Medical History:   Diagnosis Date    Anemia     Asthma     Postpartum depression     depression and anxiety     Past Surgical History:   Procedure Laterality Date    HX OTHER SURGICAL      10 ear surgeries, tubes, regraft ear drum    HX TYMPANOSTOMY       Social History     Occupational History    Not on file   Tobacco Use    Smoking status: Never Smoker    Smokeless tobacco: Never Used   Substance and Sexual Activity  Alcohol use: No    Drug use: No    Sexual activity: Yes     Partners: Male     Birth control/protection: None     Family History   Problem Relation Age of Onset    Other Sister         Heart condition- 'hole' in heart    Colon Cancer Maternal Grandmother     No Known Problems Mother     No Known Problems Father        Allergies   Allergen Reactions    Ceftin [Cefuroxime Axetil] Hives     Prior to Admission medications    Medication Sig Start Date End Date Taking? Authorizing Provider   prenatal vits62/FA/om3/dha/epa (PRENATAL GUMMY PO) Take  by mouth. Yes Provider, Historical   sertraline (ZOLOFT) 50 mg tablet Take  by mouth daily. Yes Provider, Historical   loratadine (CLARITIN) 10 mg tablet Take 10 mg by mouth. Yes Provider, Historical        Review of Systems: A comprehensive review of systems was negative except for that written in the HPI. Objective:     Vitals:  Vitals:    11/02/18 1246 11/02/18 1247   BP: 125/75    Pulse: (!) 102    Resp: 16    Temp: 98 °F (36.7 °C)    Weight:  192 lb (87.1 kg)   Height:  5' 4\" (1.626 m)        Physical Exam:  Patient is without distress. She is oriented and cooperative  Skin is warm and dry. Respirations are even and unlabored  Abdomen: soft, nontender  Gravid.  s=d  Fundus: soft and non tender  Perineum: blood absent, amniotic fluid absent  Cervical Exam: 4/60/-2  Membranes:  Intact  Fetal Heart Rate: category one    Prenatal Labs:   Lab Results   Component Value Date/Time    Rubella, External Immune 05/25/2018    GrBStrep, External Negative 10/29/2018    HBsAg, External Negative 05/25/2018    HIV, External Non-Reactive 05/25/2018    Gonorrhea, External Negative 04/27/2018    Chlamydia, External Negative 04/27/2018        Assessment/Plan:siup at 37w 1 d with contractions     Plan: Observe for cervical change. PO hydrate  Perform a UA Group B Strep was negative.     Signed By:  Cj Jean Baptiste CNM     November 2, 2018

## 2018-11-02 NOTE — DISCHARGE INSTRUCTIONS
Wyano Yessenia Contractions: Care Instructions  Your Care Instructions    Culberson Garcia contractions prepare your uterus for labor. Think of them as a \"warm-up\" exercise that your body does. You may begin to feel them between the 28th and 30th weeks of your pregnancy. But they start as early as the 20th week. Culberson Garcia contractions usually occur more often during the ninth month. They may go away when you are active and return when you rest. These contractions are like mild contractions of true labor, but they occur less often. (You feel fewer than 8 in an hour.) They don't cause your cervix to open. It may be hard for you to tell the difference between Wyano Yessenia contractions and true labor, especially in your first pregnancy. Follow-up care is a key part of your treatment and safety. Be sure to make and go to all appointments, and call your doctor if you are having problems. It's also a good idea to know your test results and keep a list of the medicines you take. How can you care for yourself at home? · Try a warm bath to help relieve muscle tension and reduce pain. · Change positions every 30 minutes. Take breaks if you must sit for a long time. Get up and walk around. · Drink plenty of water, enough so that your urine is light yellow or clear like water. · Taking short walks may help you feel better. Your doctor needs to check any contractions that are getting stronger or closer together. Where can you learn more? Go to http://fabiano-kris.info/. Enter 738 184 564 in the search box to learn more about \"Pedro Garcia Contractions: Care Instructions. \"  Current as of: November 21, 2017  Content Version: 11.8  © 5884-0817 [employer]. Care instructions adapted under license by [employer] (which disclaims liability or warranty for this information).  If you have questions about a medical condition or this instruction, always ask your healthcare professional. [employer], [facility] disclaims any warranty or liability for your use of this information. Week 38 of Your Pregnancy: Care Instructions  Your Care Instructions    Believe it or not, your baby is almost here. You may have ideas about your baby's personality because of how much he or she moves. Or you may have noticed how he or she responds to sounds, warmth, cold, and light. You may even know what kind of music your baby likes. By now, you have a better idea of what to expect during delivery. You may have talked about your birth preferences with your doctor. But even if you want a vaginal birth, it is a good idea to learn about  births.  birth means that your baby is born through a cut (incision) in your lower belly. It is sometimes the best choice for the health of the baby and the mother. This care sheet can help you understand  births. It also gives you information about what to expect after your baby is born. And it helps you understand more about postpartum depression. Follow-up care is a key part of your treatment and safety. Be sure to make and go to all appointments, and call your doctor if you are having problems. It's also a good idea to know your test results and keep a list of the medicines you take. How can you care for yourself at home? Learn about  birth  · Most C-sections are unplanned. They are done because of problems that occur during labor. These problems might include:  ? Labor that slows or stops. ? High blood pressure or other problems for the mother. ? Signs of distress in the baby. These signs may include a very fast or slow heart rate. · Although most mothers and babies do well after , it is major surgery. It has more risks than a vaginal delivery. · In some cases, a planned  may be safer than a vaginal delivery. This may be the case if:  ? The mother has a health problem, such as a heart condition. ?  The baby isn't in a head-down position for delivery. This is called a breech position. ? The uterus has scars from past surgeries. This could increase the chance of a tear in the uterus. ? There is a problem with the placenta. ? The mother has an infection, such as genital herpes, that could be spread to the baby. ? The mother is having twins or more. ? The baby weighs 9 to 10 pounds or more. · Because of the risks of , planned C-sections generally should be done only for medical reasons. And a planned  should be done at 39 weeks or later unless there is a medical reason to do it sooner. Know what to expect after delivery, and plan for the first few weeks at home  · You, your baby, and your partner or  will get identification bands. Only people with matching bands can  the baby from the nursery. · You will learn how to feed, diaper, and bathe your baby. And you will learn how to care for the umbilical cord stump. If your baby will be circumcised, you will also learn how to care for that. · Ask people to wait to visit you until you are at home. And ask them to wash their hands before they touch your baby. · Make sure you have another adult in your home for at least 2 or 3 days after the birth. · During the first 2 weeks, limit when friends and family can visit. · Do not allow visitors who have colds or infections. Make sure all visitors are up to date with their vaccinations. Never let anyone smoke around your baby. · Try to nap when the baby naps. Be aware of postpartum depression  · \"Baby blues\" are common for the first 1 to 2 weeks after birth. You may cry or feel sad or irritable for no reason. · For some women, these feelings last longer and are more intense. This is called postpartum depression. · If your symptoms last for more than a few weeks or you feel very depressed, ask your doctor for help. · Postpartum depression can be treated. Support groups and counseling can help. Sometimes medicine can also help. Where can you learn more? Go to http://fabiano-kris.info/. Enter B044 in the search box to learn more about \"Week 38 of Your Pregnancy: Care Instructions. \"  Current as of: November 21, 2017  Content Version: 11.8  © 9498-1098 [employer]. Care instructions adapted under license by [employer] (which disclaims liability or warranty for this information). If you have questions about a medical condition or this instruction, always ask your healthcare professional. [place] [address] any warranty or liability for your use of this information. Week 37 of Your Pregnancy: Care Instructions  Your Care Instructions    You are near the end of your pregnancy--and you're probably pretty uncomfortable. It may be harder to walk around. Lying down probably isn't comfortable either. You may have trouble getting to sleep or staying asleep. Most women deliver their babies between 40 and 41 weeks. This is a good time to think about packing a bag for the hospital with items you'll need. Then you'll be ready when labor starts. Follow-up care is a key part of your treatment and safety. Be sure to make and go to all appointments, and call your doctor if you are having problems. It's also a good idea to know your test results and keep a list of the medicines you take. How can you care for yourself at home? Learn about breastfeeding  · Breastfeeding is best for your baby and good for you. · Breast milk has antibodies to help your baby fight infections. · Mothers who breastfeed often lose weight faster, because making milk burns calories. · Learning the best ways to hold your baby will make breastfeeding easier. · Let your partner bathe and diaper the baby to keep your partner from feeling left out. Snuggle together when you breastfeed. · You may want to learn how to use a breast pump and store your milk.   · If you choose to bottle feed, make the feeding feel like breastfeeding so you can bond with your baby. Always hold your baby and the bottle. Do not prop bottles or let your baby fall asleep with a bottle. Learn about crying  · It is common for babies to cry for 1 to 3 hours a day. Some cry more, some cry less. · Babies don't cry to make you upset or because you are a bad parent. · Crying is how your baby communicates. Your baby may be hungry; have gas; need a diaper change; or feel cold, warm, tired, lonely, or tense. Sometimes babies cry for unknown reasons. · If you respond to your baby's needs, he or she will learn to trust you. · Try to stay calm when your baby cries. Your baby may get more upset if he or she senses that you are upset. Know how to care for your   · Your baby's umbilical cord stump will drop off on its own, usually between 1 and 2 weeks. To care for your baby's umbilical cord area:  ? Clean the area at the bottom of the cord 2 or 3 times a day. ? Pay special attention to the area where the cord attaches to the skin. ? Keep the diaper folded below the cord. ? Use a damp washcloth or cotton ball to sponge bathe your baby until the stump has come off. · Your baby's first dark stool is called meconium. After the meconium is passed, your baby will develop his or her own bowel pattern. ? Some babies, especially  babies, have several bowel movements a day. Others have one or two a day, or one every 2 to 3 days. ?  babies often have loose, yellow stools. Formula-fed babies have more formed stools. ? If your baby's stools look like little pellets, he or she is constipated. After 2 days of constipation, call your baby's doctor. · If your baby will be circumcised, you can care for him at home. ? Gently rinse his penis with warm water after every diaper change. Do not try to remove the film that forms on the penis. This film will go away on its own. Pat dry.   ? Put petroleum ointment, such as Vaseline, on the area of the diaper that will touch your baby's penis. This will keep the diaper from sticking to your baby. ? Ask the doctor about giving your baby acetaminophen (Tylenol) for pain. Where can you learn more? Go to http://fabiano-kris.info/. Enter 68 21 [id number] in the search box to learn more about \"Week 37 of Your Pregnancy: Care Instructions. \"  Current as of: November 21, 2017  Content Version: 11.8  © 8229-0100 [employer]wise[employer]. Care instructions adapted under license by [employer] (which disclaims liability or warranty for this information). If you have questions about a medical condition or this instruction, always ask your healthcare professional. [address] any warranty or liability for your use of this information.

## 2018-11-02 NOTE — PROGRESS NOTES
1243 Patient arrived for r/o labor.  Positive fetal movement, no vaginal bleeding or leaking of fluid

## 2018-11-02 NOTE — PROGRESS NOTES
Labor Progress Note  Patient seen, fetal heart rate and contraction pattern evaluated, patient examined. Patient is visibly uncomfortable with contractions. States they are getting stronger  Visit Vitals  /64   Pulse 97   Temp 97.7 °F (36.5 °C)   Resp 14   Ht 5' 4\" (1.626 m)   Wt 192 lb (87.1 kg)   LMP 02/15/2018   Breastfeeding?  Yes   BMI 32.96 kg/m²       Physical Exam:  Cervical Exam: 4.5/60/-2 with BBOW  Membranes:  Intact  Uterine Activity: every 2-4  Fetal Heart Rate: category one      Assessment/Plan:  Continue to monitor for labor

## 2018-11-02 NOTE — PROGRESS NOTES
Labor Progress Note  Patient seen, fetal heart rate and contraction pattern evaluated, patient examined. Reviewed options with patient about therapeutic rest and IV hydration or discharge home. Pt decides to go home with labor precautions after education. Visit Vitals  /71   Pulse (!) 112   Temp 97.7 °F (36.5 °C)   Resp 14   Ht 5' 4\" (1.626 m)   Wt 87.1 kg (192 lb)   LMP 02/15/2018   Breastfeeding? Yes   BMI 32.96 kg/m²         Physical Exam:  Cervical Exam:  4.5/60/-3 Vertex ballotable  Membranes:  Intact  Uterine Activity: 3-6 min; mild; 50-70 sec  Fetal Heart Rate: Category 1    Assessment/Plan:  Reassuring fetal status  Discharge Home  Labor precautions given  Follow-up appointment scheduled for Monday, pt to call if contractions intensify, vaginal bleeding, LOF, or decreased fetal movement.     BRETT Macedo

## 2018-11-02 NOTE — TELEPHONE ENCOUNTER
Patient calling , 37w1d pregnant, C/O consistent contractions since yesterday evening at 3-5 minutes a part that are coming more intense. She denies bleeding/Rom but states she was 3.5 cm dilated and 50% effaced at her last visit on 10/29/2018. This nurse spoke with Lenora Colbert and advised that she go to L&D at either [facility] or [facility] if she cannot make it to [facility]. Spoke with patient - She reports that she is home without transportation and that she will have to call her mom since her  is in [address] She states that she has contacted her mother and on the way. Patient reiterated to again that if she feels she cannot make it to [facility], to go to the nearest hospital which for her [facility] be [facility]. Patient verbalized understanding.     Report called into [facility] L&D 10:25am and spoke with Sara Herrera - she is aware that if patient cannot make it to [facility], she will go to the nearest hospital.

## 2018-11-02 NOTE — PROGRESS NOTES
1310 Bedside shift change report given to Gasper Del Cid RN (oncoming nurse) by Demarcus Sanchze RN (offgoing nurse). Report included the following information SBAR, Intake/Output and MAR.     218 [facility] CNM into see patient. SVE by her. New orders given. 1320 Patient off external fetal monitors up to walk. Patient instructed to return to unit by [phone] for external fetal monitoring. 1430 Patient back from walking and external fetal heart monitors place on abdomen. 1439 Patient up to bathroom. 1441 Patient back to bed.     2302 [facility] CNM into see patient. Patient off monitor up to walk. [id number] Patient back to bed and external fetal heart monitors applied to abdomen. 200 [facility] CNM into see patient. SVE by her.    4224 Patient back to bed and placed on external fetal heart monitors. [id number] [facility] CN of patient's lab results. Orders given for patient to ambulate. Patient off external fetal monitors and up to walk. 1900 Patient back to bed and placed on external fetal heart monitors. 1936 Patient off external fetal monitors. Alfred Vigil CNM into talk with patient. New orders given. 1945 Bedside shift change report given to Hung Corral RN (oncoming nurse) by Gasper Del Cid RN (offgoing nurse). Report included the following information SBAR, Intake/Output and MAR.

## 2018-11-03 NOTE — PROGRESS NOTES
1945 - Bedside and Verbal shift change report given to HARSH Edgar RNC (oncoming nurse) by LISA Rouse RN (offgoing nurse). Report included the following information SBAR, Kardex and Med Rec Status. Misael Bright CNM and ANA Thompson NP at bedside to discuss options, since SVE is unchanged (done 10 min ago, by ANA Graff NP). Pt was encouraged to go home, unless she feels that she needs pain medication and IV hydration, to see if UCs space out or cease. Pt and  will take a few minutes to think about what they want to do.    2000 - Pt decided to go home. 2015 - Pt given discharge instructions (explained, questions answered), kept signed copy on Unit. Walked off unit with , who will be driving them home.

## 2018-11-04 LAB
BACTERIA SPEC CULT: NORMAL
CC UR VC: NORMAL
SERVICE CMNT-IMP: NORMAL

## 2018-11-05 ENCOUNTER — HOSPITAL ENCOUNTER (INPATIENT)
Age: 23
LOS: 1 days | Discharge: HOME | End: 2018-11-07
Attending: OBSTETRICS & GYNECOLOGY | Admitting: OBSTETRICS & GYNECOLOGY
Payer: COMMERCIAL

## 2018-11-05 ENCOUNTER — ANESTHESIA EVENT (OUTPATIENT)
Dept: LABOR AND DELIVERY | Age: 23
End: 2018-11-05
Payer: COMMERCIAL

## 2018-11-05 ENCOUNTER — ROUTINE PRENATAL (OUTPATIENT)
Dept: OBGYN CLINIC | Age: 23
End: 2018-11-05

## 2018-11-05 ENCOUNTER — ANESTHESIA (OUTPATIENT)
Dept: LABOR AND DELIVERY | Age: 23
End: 2018-11-05
Payer: COMMERCIAL

## 2018-11-05 VITALS — SYSTOLIC BLOOD PRESSURE: 138 MMHG | DIASTOLIC BLOOD PRESSURE: 84 MMHG | WEIGHT: 196 LBS | BODY MASS INDEX: 33.64 KG/M2

## 2018-11-05 DIAGNOSIS — Z34.83 PRENATAL CARE, SUBSEQUENT PREGNANCY IN THIRD TRIMESTER: Primary | ICD-10-CM

## 2018-11-05 LAB
ERYTHROCYTE [DISTWIDTH] IN BLOOD BY AUTOMATED COUNT: 14.6 % (ref 11.5–14.5)
HCT VFR BLD AUTO: 28.4 % (ref 35–47)
HGB BLD-MCNC: 8.9 G/DL (ref 11.5–16)
MCH RBC QN AUTO: 26.1 PG (ref 26–34)
MCHC RBC AUTO-ENTMCNC: 31.3 G/DL (ref 30–36.5)
MCV RBC AUTO: 83.3 FL (ref 80–99)
NRBC # BLD: 0 K/UL (ref 0–0.01)
NRBC BLD-RTO: 0 PER 100 WBC
PLATELET # BLD AUTO: 199 K/UL (ref 150–400)
PMV BLD AUTO: 12.2 FL (ref 8.9–12.9)
RBC # BLD AUTO: 3.41 M/UL (ref 3.8–5.2)
WBC # BLD AUTO: 7 K/UL (ref 3.6–11)

## 2018-11-05 PROCEDURE — 10907ZC DRAINAGE OF AMNIOTIC FLUID, THERAPEUTIC FROM PRODUCTS OF CONCEPTION, VIA NATURAL OR ARTIFICIAL OPENING: ICD-10-PCS | Performed by: OBSTETRICS & GYNECOLOGY

## 2018-11-05 PROCEDURE — 36415 COLL VENOUS BLD VENIPUNCTURE: CPT | Performed by: OBSTETRICS & GYNECOLOGY

## 2018-11-05 PROCEDURE — 4A0HXCZ MEASUREMENT OF PRODUCTS OF CONCEPTION, CARDIAC RATE, EXTERNAL APPROACH: ICD-10-PCS | Performed by: OBSTETRICS & GYNECOLOGY

## 2018-11-05 PROCEDURE — 3E0R3BZ INTRODUCTION OF ANESTHETIC AGENT INTO SPINAL CANAL, PERCUTANEOUS APPROACH: ICD-10-PCS | Performed by: ANESTHESIOLOGY

## 2018-11-05 PROCEDURE — 75410000002 HC LABOR FEE PER 1 HR

## 2018-11-05 PROCEDURE — 76060000078 HC EPIDURAL ANESTHESIA

## 2018-11-05 PROCEDURE — 74011250636 HC RX REV CODE- 250/636: Performed by: ADVANCED PRACTICE MIDWIFE

## 2018-11-05 PROCEDURE — 77030011943

## 2018-11-05 PROCEDURE — 74011000250 HC RX REV CODE- 250

## 2018-11-05 PROCEDURE — 65270000029 HC RM PRIVATE

## 2018-11-05 PROCEDURE — 85027 COMPLETE CBC AUTOMATED: CPT | Performed by: OBSTETRICS & GYNECOLOGY

## 2018-11-05 PROCEDURE — 00HU33Z INSERTION OF INFUSION DEVICE INTO SPINAL CANAL, PERCUTANEOUS APPROACH: ICD-10-PCS | Performed by: ANESTHESIOLOGY

## 2018-11-05 PROCEDURE — 74011250636 HC RX REV CODE- 250/636: Performed by: ANESTHESIOLOGY

## 2018-11-05 PROCEDURE — 75410000000 HC DELIVERY VAGINAL/SINGLE

## 2018-11-05 PROCEDURE — 74011250636 HC RX REV CODE- 250/636

## 2018-11-05 RX ORDER — BUPIVACAINE HYDROCHLORIDE 2.5 MG/ML
INJECTION, SOLUTION EPIDURAL; INFILTRATION; INTRACAUDAL AS NEEDED
Status: DISCONTINUED | OUTPATIENT
Start: 2018-11-05 | End: 2018-11-05 | Stop reason: HOSPADM

## 2018-11-05 RX ORDER — DOCUSATE SODIUM 100 MG/1
100 CAPSULE, LIQUID FILLED ORAL
Status: DISCONTINUED | OUTPATIENT
Start: 2018-11-05 | End: 2018-11-07 | Stop reason: HOSPADM

## 2018-11-05 RX ORDER — SODIUM CHLORIDE, SODIUM LACTATE, POTASSIUM CHLORIDE, CALCIUM CHLORIDE 600; 310; 30; 20 MG/100ML; MG/100ML; MG/100ML; MG/100ML
125 INJECTION, SOLUTION INTRAVENOUS CONTINUOUS
Status: DISCONTINUED | OUTPATIENT
Start: 2018-11-05 | End: 2018-11-06

## 2018-11-05 RX ORDER — OXYTOCIN/RINGER'S LACTATE 20/1000 ML
999 PLASTIC BAG, INJECTION (ML) INTRAVENOUS AS NEEDED
Status: DISCONTINUED | OUTPATIENT
Start: 2018-11-05 | End: 2018-11-07 | Stop reason: HOSPADM

## 2018-11-05 RX ORDER — AMMONIA 15 % (W/V)
1 AMPUL (EA) INHALATION AS NEEDED
Status: DISCONTINUED | OUTPATIENT
Start: 2018-11-05 | End: 2018-11-07 | Stop reason: HOSPADM

## 2018-11-05 RX ORDER — NALOXONE HYDROCHLORIDE 0.4 MG/ML
0.4 INJECTION, SOLUTION INTRAMUSCULAR; INTRAVENOUS; SUBCUTANEOUS AS NEEDED
Status: DISCONTINUED | OUTPATIENT
Start: 2018-11-05 | End: 2018-11-06 | Stop reason: HOSPADM

## 2018-11-05 RX ORDER — SODIUM CHLORIDE 0.9 % (FLUSH) 0.9 %
5-10 SYRINGE (ML) INJECTION EVERY 8 HOURS
Status: DISCONTINUED | OUTPATIENT
Start: 2018-11-06 | End: 2018-11-06

## 2018-11-05 RX ORDER — SIMETHICONE 80 MG
80 TABLET,CHEWABLE ORAL
Status: DISCONTINUED | OUTPATIENT
Start: 2018-11-05 | End: 2018-11-07 | Stop reason: HOSPADM

## 2018-11-05 RX ORDER — FENTANYL CITRATE 50 UG/ML
100 INJECTION, SOLUTION INTRAMUSCULAR; INTRAVENOUS ONCE
Status: DISCONTINUED | OUTPATIENT
Start: 2018-11-05 | End: 2018-11-06 | Stop reason: HOSPADM

## 2018-11-05 RX ORDER — IBUPROFEN 400 MG/1
800 TABLET ORAL EVERY 8 HOURS
Status: DISCONTINUED | OUTPATIENT
Start: 2018-11-06 | End: 2018-11-07 | Stop reason: HOSPADM

## 2018-11-05 RX ORDER — FENTANYL/BUPIVACAINE/NS/PF 2-1250MCG
1-16 PREFILLED PUMP RESERVOIR EPIDURAL CONTINUOUS
Status: DISCONTINUED | OUTPATIENT
Start: 2018-11-05 | End: 2018-11-06 | Stop reason: HOSPADM

## 2018-11-05 RX ORDER — BUPIVACAINE HYDROCHLORIDE 2.5 MG/ML
15 INJECTION, SOLUTION EPIDURAL; INFILTRATION; INTRACAUDAL ONCE
Status: DISPENSED | OUTPATIENT
Start: 2018-11-05 | End: 2018-11-06

## 2018-11-05 RX ORDER — HYDROCODONE BITARTRATE AND ACETAMINOPHEN 5; 325 MG/1; MG/1
1 TABLET ORAL
Status: DISCONTINUED | OUTPATIENT
Start: 2018-11-05 | End: 2018-11-07 | Stop reason: HOSPADM

## 2018-11-05 RX ORDER — OXYTOCIN/0.9 % SODIUM CHLORIDE 30/500 ML
PLASTIC BAG, INJECTION (ML) INTRAVENOUS
Status: COMPLETED
Start: 2018-11-05 | End: 2018-11-05

## 2018-11-05 RX ORDER — EPHEDRINE SULFATE 50 MG/ML
INJECTION, SOLUTION INTRAVENOUS
Status: COMPLETED
Start: 2018-11-05 | End: 2018-11-05

## 2018-11-05 RX ORDER — NALBUPHINE HYDROCHLORIDE 10 MG/ML
2.5 INJECTION, SOLUTION INTRAMUSCULAR; INTRAVENOUS; SUBCUTANEOUS
Status: DISCONTINUED | OUTPATIENT
Start: 2018-11-05 | End: 2018-11-06 | Stop reason: HOSPADM

## 2018-11-05 RX ORDER — HYDROCORTISONE ACETATE PRAMOXINE HCL 2.5; 1 G/100G; G/100G
CREAM TOPICAL AS NEEDED
Status: DISCONTINUED | OUTPATIENT
Start: 2018-11-05 | End: 2018-11-07 | Stop reason: HOSPADM

## 2018-11-05 RX ORDER — FENTANYL CITRATE 50 UG/ML
100 INJECTION, SOLUTION INTRAMUSCULAR; INTRAVENOUS
Status: ACTIVE | OUTPATIENT
Start: 2018-11-05 | End: 2018-11-06

## 2018-11-05 RX ORDER — OXYTOCIN/0.9 % SODIUM CHLORIDE 30/500 ML
0-25 PLASTIC BAG, INJECTION (ML) INTRAVENOUS
Status: DISCONTINUED | OUTPATIENT
Start: 2018-11-05 | End: 2018-11-06

## 2018-11-05 RX ORDER — NALBUPHINE HYDROCHLORIDE 10 MG/ML
2.5 INJECTION, SOLUTION INTRAMUSCULAR; INTRAVENOUS; SUBCUTANEOUS
Status: ACTIVE | OUTPATIENT
Start: 2018-11-05 | End: 2018-11-05

## 2018-11-05 RX ORDER — HYDROCORTISONE 1 %
CREAM (GRAM) TOPICAL AS NEEDED
Status: DISCONTINUED | OUTPATIENT
Start: 2018-11-05 | End: 2018-11-07 | Stop reason: HOSPADM

## 2018-11-05 RX ORDER — SODIUM CHLORIDE 0.9 % (FLUSH) 0.9 %
5-10 SYRINGE (ML) INJECTION AS NEEDED
Status: DISCONTINUED | OUTPATIENT
Start: 2018-11-05 | End: 2018-11-07 | Stop reason: HOSPADM

## 2018-11-05 RX ORDER — ACETAMINOPHEN 325 MG/1
650 TABLET ORAL
Status: DISCONTINUED | OUTPATIENT
Start: 2018-11-05 | End: 2018-11-07 | Stop reason: HOSPADM

## 2018-11-05 RX ADMIN — OXYTOCIN-SODIUM CHLORIDE 0.9% IV SOLN 30 UNIT/500ML 2 MILLI-UNITS/MIN: 30-0.9/5 SOLUTION at 21:20

## 2018-11-05 RX ADMIN — SODIUM CHLORIDE, SODIUM LACTATE, POTASSIUM CHLORIDE, AND CALCIUM CHLORIDE 125 ML/HR: 600; 310; 30; 20 INJECTION, SOLUTION INTRAVENOUS at 20:29

## 2018-11-05 RX ADMIN — Medication 2 MILLI-UNITS/MIN: at 21:20

## 2018-11-05 RX ADMIN — BUPIVACAINE HYDROCHLORIDE 5 ML: 2.5 INJECTION, SOLUTION EPIDURAL; INFILTRATION; INTRACAUDAL at 18:13

## 2018-11-05 RX ADMIN — BUPIVACAINE HYDROCHLORIDE 5 ML: 2.5 INJECTION, SOLUTION EPIDURAL; INFILTRATION; INTRACAUDAL at 18:16

## 2018-11-05 RX ADMIN — BUPIVACAINE HYDROCHLORIDE 2 ML: 2.5 INJECTION, SOLUTION EPIDURAL; INFILTRATION; INTRACAUDAL at 18:11

## 2018-11-05 RX ADMIN — EPHEDRINE SULFATE 10 MG: 50 INJECTION, SOLUTION INTRAVENOUS at 18:29

## 2018-11-05 RX ADMIN — Medication 10 ML/HR: at 18:20

## 2018-11-05 NOTE — ANESTHESIA PREPROCEDURE EVALUATION
Anesthetic History No history of anesthetic complications Review of Systems / Medical History Patient summary reviewed, nursing notes reviewed and pertinent labs reviewed Pulmonary Within defined limits Asthma Neuro/Psych Within defined limits Cardiovascular Within defined limits GI/Hepatic/Renal 
Within defined limits Endo/Other Within defined limits Obesity Other Findings Physical Exam 
 
Airway Mallampati: II 
TM Distance: > 6 cm Neck ROM: normal range of motion Mouth opening: Normal 
 
 Cardiovascular Regular rate and rhythm,  S1 and S2 normal,  no murmur, click, rub, or gallop Dental 
No notable dental hx Pulmonary Breath sounds clear to auscultation Abdominal 
GI exam deferred Other Findings Anesthetic Plan ASA: 2 Anesthesia type: epidural 
 
 
 
 
Induction: Intravenous Anesthetic plan and risks discussed with: Patient

## 2018-11-05 NOTE — PROGRESS NOTES
Patient having lots of pressure and irregular contractions. Denies LOF/VB. Good fetal movement.  Pt sent walking and will re-examine in office or if contractions worsen, pt will head to hospital.

## 2018-11-05 NOTE — PATIENT INSTRUCTIONS

## 2018-11-05 NOTE — PROGRESS NOTES
Labor Progress Note Patient seen s/p epidural - fetal heart rate and contraction pattern evaluated, patient examined. Visit Vitals BP (!) 85/48 Pulse 98 Temp 97.5 °F (36.4 °C) Resp 16 Ht 5' 4\" (1.626 m) Wt 196 lb (88.9 kg) LMP 02/15/2018 BMI 33.64 kg/m² Physical Exam: 
Cervical Exam:  6-7/90/-1 Membranes:  Artificial Rupture of Membranes; Amniotic Fluid: copius amount of clear fluid Uterine Activity: 2-4 Fetal Heart Rate: cat 1 Assessment/Plan: 
recheck at 2100 and straight kika Smith CNM

## 2018-11-05 NOTE — H&P
History & Physical 
 
Name: Clara Tracy MRN: [id number]  SSN: [id number] YOB: 1995  Age: 20 y.o. Sex: female Subjective:   
 
Estimated Date of Delivery: 18 OB History  Para Term  AB Living 3 2 2 0 0 2 SAB TAB Ectopic Molar Multiple Live Births  
0 0 0 0 0 2 # Outcome Date GA Lbr Faraz/2nd Weight Sex Delivery Anes PTL Lv  
3 Current 2 Term 02/16/15 39w0d  3.657 kg M Vag-Spont EPIDURAL AN  FARHANA  
1 Term 13   4.167 kg F Vag-Spont EPI N FARHANA Ms. Zohra Woodard is admitted with pregnancy at 37w4d for advanced dilation, contractions, BBOW from the office. Prenatal course was normal. Please see prenatal records for details. Past Medical History:  
Diagnosis Date  Anemia  Asthma  Postpartum depression   
 depression and anxiety Past Surgical History:  
Procedure Laterality Date  HX OTHER SURGICAL  2011  
 10 ear surgeries, tubes, regraft ear drum  HX TYMPANOSTOMY Social History Occupational History  Not on file Tobacco Use  Smoking status: Never Smoker  Smokeless tobacco: Never Used Substance and Sexual Activity  Alcohol use: No  
 Drug use: No  
 Sexual activity: Yes  
  Partners: Male Birth control/protection: None Family History Problem Relation Age of Onset  Other Sister Heart condition- 'hole' in heart  Colon Cancer Maternal Grandmother  No Known Problems Mother  No Known Problems Father Allergies Allergen Reactions  Ceftin [Cefuroxime Axetil] Hives Prior to Admission medications Medication Sig Start Date End Date Taking? Authorizing Provider  
prenatal vits62/FA/om3/dha/epa (PRENATAL GUMMY PO) Take  by mouth. Yes Provider, Historical  
sertraline (ZOLOFT) 50 mg tablet Take  by mouth daily. Yes Provider, Historical  
loratadine (CLARITIN) 10 mg tablet Take 10 mg by mouth.    Yes Provider, Historical  
  
 
 Review of Systems: A comprehensive review of systems was negative except for that written in the HPI. Objective:  
 
Vitals: 
Vitals:  
 11/05/18 1635 11/05/18 1636 11/05/18 1829 BP: 130/66  (!) 85/48 Pulse: 98 Resp: 16 Temp: 97.5 °F (36.4 °C) Weight:  88.9 kg (196 lb) Height:  5' 4\" (1.626 m) Physical Exam: 
Patient without distress. Lung: normal respiratory effort Abdomen: soft, nontender, Gravid Fundus: soft and non tender Perineum: blood absent, amniotic fluid absent Cervical Exam: 7/80/-1 Lower Extremities:  - Edema No 
Membranes:  Intact Fetal Heart Rate: Category 1 Prenatal Labs:  
Lab Results Component Value Date/Time  
 Rubella, External Immune 05/25/2018 GrBStrep, External Negative 10/29/2018 HBsAg, External Negative 05/25/2018 HIV, External Non-Reactive 05/25/2018 Gonorrhea, External Negative 04/27/2018 Chlamydia, External Negative 04/27/2018 ABO,Rh AB Positive 05/25/2018 Assessment/Plan:  
 
37w4d IUP with polyhydramnios Advanced dilation with history of precipitous birth Plan: Admit for Reassuring fetal status, Labor  Progressing normally Continue expectant management, Continue plan for vaginal delivery. Group B Strep was negative. Epidural for pain management Frequent position changes Signed By:  BRETT Garcia November 5, 2018

## 2018-11-06 PROBLEM — Z3A.10 10 WEEKS GESTATION OF PREGNANCY: Status: RESOLVED | Noted: 2018-04-27 | Resolved: 2018-11-06

## 2018-11-06 PROCEDURE — 75410000003 HC RECOV DEL/VAG/CSECN EA 0.5 HR

## 2018-11-06 PROCEDURE — 77030011943

## 2018-11-06 PROCEDURE — 74011250637 HC RX REV CODE- 250/637: Performed by: ADVANCED PRACTICE MIDWIFE

## 2018-11-06 RX ADMIN — IBUPROFEN 800 MG: 400 TABLET ORAL at 03:35

## 2018-11-06 RX ADMIN — IBUPROFEN 800 MG: 400 TABLET ORAL at 23:45

## 2018-11-06 RX ADMIN — ACETAMINOPHEN 650 MG: 325 TABLET ORAL at 09:06

## 2018-11-06 RX ADMIN — IBUPROFEN 800 MG: 400 TABLET ORAL at 14:20

## 2018-11-06 RX ADMIN — ACETAMINOPHEN 650 MG: 325 TABLET ORAL at 18:34

## 2018-11-06 RX ADMIN — ACETAMINOPHEN 650 MG: 325 TABLET ORAL at 14:26

## 2018-11-06 NOTE — PROGRESS NOTES
1800 pt up to bathroom and voided. 1837 AROM, clear large amt of fluid.  Pt comfortable with epidural.

## 2018-11-06 NOTE — ANESTHESIA PROCEDURE NOTES
Epidural Block Performed by: Leann Mclean MD 
Authorized by: Leann Mclean MD  
 
Pre-Procedure Indication: labor epidural   
 
Assessment:  
Labor Epidural catheter placement Procedure Note Epidural analgesia requested. All questions answered regarding epidural placement. Patient wishes to proceed. \"Time-out\" performed Patient was placed in the seated position. The back was prepped and draped at the lumbar region. Lidocaine 1% x 2ml was injected locally at ~L3 - L4. A 17G Tuohy needle was placed at the above level. Loss of resistance was obtained, no CSF or paresthesia was apparent. A 19 G epidural catheter was placed and secured at ~7 cm at the skin. Aspiration was negative. No paresthesias noted. A test dose of local anesthetic as charted  was negative for heart rate change, tinnitus, metallic taste or mental status changes. Catheter was secured with Tegaderm and tape. Constant infusion w/ patient controlled bolus as ordered was started by RN. No apparent complications. Vital signs were stable and the patient reported relief.

## 2018-11-06 NOTE — PROGRESS NOTES
Bedside shift change report given to Reji RN and Soni RN (oncoming nurse) by Ebonie Mayen RN (offgoing nurse). Report included the following information SBAR and MAR.  
 NERY Dixon, at the bedside. SVE performed, no change.  NERY Dixon, at the bedside. Patient complete.   by NERY Dixon. 200 TRANSFER - OUT REPORT: 
 
Verbal report given to SUPRIYA Ignacio (name) on [employer]  being transferred to miu(unit) for routine progression of care Report consisted of patients Situation, Background, Assessment and  
Recommendations(SBAR). Information from the following report(s) SBAR, Intake/Output and MAR was reviewed with the receiving nurse. Lines:  
Saline Lock 18 Lower;Right; Inner Arm (Active) Site Assessment Clean, dry, & intact 2018  7:43 PM  
Phlebitis Assessment 0 2018  7:43 PM  
Infiltration Assessment 0 2018  7:43 PM  
Dressing Status Clean, dry, & intact 2018  7:43 PM  
Dressing Type Transparent 2018  7:43 PM  
Hub Color/Line Status Pink; Infusing 2018  7:43 PM  
  
 
Opportunity for questions and clarification was provided. Patient transported with: 
 Registered Nurse

## 2018-11-06 NOTE — PROGRESS NOTES
The patient is transferred from L/D to the postpartum room 243 by LISA Turner RN. SBAR- report received. Assuming care of the patient at this time. 07:00 AM: patient pumped; colostrum is sent to NICU by RN for the next baby's feeding. The patient will continue to pump colostrum.

## 2018-11-06 NOTE — ROUTINE PROCESS
0730: Bedside shift change report given to GINA Bose RN (oncoming nurse) by Abril Walter RN (offgoing nurse). Report included the following information SBAR.

## 2018-11-06 NOTE — LACTATION NOTE
Initial Lactation Consultation - Pt delivered yesterday evening at 37 4/7 weeks gestation. She is a . Patient states she attempted to nurse her other 2 children but neither would latch well and she ended up bottle feeding formula. She states this baby had been latching and nursing well but he was transferred to the NICU this morning. Mom has been pumping and taking her breast milk to the baby. I recommended that mom pump at least every 3 hours. She knows to label her milk before taking it to the NICU.

## 2018-11-06 NOTE — PROGRESS NOTES
Post-Partum Day Number 1 Progress Note Soni Huerta Assessment: Doing well, post partum day 1, doing well, baby to NICU for transition Plan: 1. Continue routine postpartum and perineal care as well as maternal education. 2. Desires circ MD will consent Information for the patient's :  Kyle Montenegro [[id number]] Vaginal, Spontaneous Patient doing well without significant complaint. Voiding without difficulty, normal lochia. Vitals: 
Visit Vitals /61 (BP 1 Location: Right arm) Pulse 95 Temp 97.9 °F (36.6 °C) Resp 16 Ht 5' 4\" (1.626 m) Wt 196 lb (88.9 kg) LMP 02/15/2018 SpO2 100% Breastfeeding? Unknown BMI 33.64 kg/m² Temp (24hrs), Av.3 °F (36.8 °C), Min:97.5 °F (36.4 °C), Max:99.1 °F (37.3 °C) Exam:   Patient without distress. Abdomen soft, fundus firm, nontender Perineum with normal lochia noted. Lower extremities are negative for swelling, cords or tenderness. Labs:  
 
Lab Results Component Value Date/Time WBC 7.0 2018 05:14 PM  
 WBC 7.6 2018 02:52 PM  
 WBC 7.2 2018 03:40 PM  
 HGB 8.9 (L) 2018 05:14 PM  
 HGB 10.3 (L) 2018 02:52 PM  
 HGB 11.2 2018 03:40 PM  
 HCT 28.4 (L) 2018 05:14 PM  
 HCT 31.4 (L) 2018 02:52 PM  
 HCT 34.0 2018 03:40 PM  
 PLATELET 772  05:14 PM  
 PLATELET 048 4995 02:52 PM  
 PLATELET 877  03:40 PM  
 Hgb, External 10.3 2018 Hgb, External 11.2 2018 Hct, External 31.4 2018 Hct, External 34 2018 Platelet cnt., External 253 2018 Platelet cnt., External 271 2018 Recent Results (from the past 24 hour(s)) CBC W/O DIFF Collection Time: 18  5:14 PM  
Result Value Ref Range WBC 7.0 3.6 - 11.0 K/uL  
 RBC 3.41 (L) 3.80 - 5.20 M/uL HGB 8.9 (L) 11.5 - 16.0 g/dL HCT 28.4 (L) 35.0 - 47.0 %  MCV 83.3 80.0 - 99.0 FL  
 MCH 26.1 26.0 - 34.0 PG  
 MCHC 31.3 30.0 - 36.5 g/dL  
 RDW 14.6 (H) 11.5 - 14.5 % PLATELET 462 535 - 918 K/uL MPV 12.2 8.9 - 12.9 FL  
 NRBC 0.0 0  WBC ABSOLUTE NRBC 0.00 0.00 - 0.01 K/uL

## 2018-11-06 NOTE — PROGRESS NOTES
Labor Progress Note Patient seen, fetal heart rate and contraction pattern evaluated, patient examined. Pt comfortable with epidural, peanut ball in place. Patient Vitals for the past 1 hrs: 
 BP Pulse SpO2  
11/05/18 2028 97/54 95 100 % 11/05/18 2005 100/49 93  Physical Exam: 
Cervical Exam:  No cervical change Membranes:  Artificial Rupture of Membranes; Amniotic Fluid: medium amount of clear fluid Uterine Activity: 2.5-4min; mild-mod Fetal Heart Rate: Category 1; Baseline 145 Assessment/Plan: 
Reassuring fetal status, Labor  Not progressing normally  start pitocin augmentation, Continue plan for vaginal delivery Epidural for pain management Pt verbalizes consent to begin pitocin for augmentation Continue frequent position changes/peanut ball BRETT Randhawa

## 2018-11-06 NOTE — ANESTHESIA POSTPROCEDURE EVALUATION
* No procedures listed *. 
 
<BSHSIANPOST> Visit Vitals /61 (BP 1 Location: Right arm) Pulse 95 Temp 36.6 °C (97.9 °F) Resp 16 Ht 5' 4\" (1.626 m) Wt 88.9 kg (196 lb) SpO2 100% Breastfeeding? Unknown BMI 33.64 kg/m²

## 2018-11-06 NOTE — PROGRESS NOTES
Delivery Note Provider:  Ludivina Manjarrez CNM Assistant: none Pre-Delivery Diagnosis: Term pregnancy Post-Delivery Diagnosis: Living  infant(s) and Male Intrapartum Event: None Procedure: Spontaneous vaginal delivery Epidural: YES Monitor:  Fetal Heart Tones - External and Uterine Contractions - External 
 
Indications for instrumental delivery: none Estimated Blood Loss:  
 
Episiotomy: none Laceration(s):  none Laceration(s) repair: NO 
 
Presentation: Cephalic Fetal Description: preciado Fetal Position: Left Occiput Anterior Birth Weight: TBD Birth Length: TBD Apgar - One Minute: 8 Apgar - Five Minutes: 9 Umbilical Cord: 3 vessels present Specimens: Cord blood Complications:  none Cord Blood Results:  
Information for the patient's :  Court Slater [[id number]] No results found for: PCTABR, PCTDIG, BILI, [address] Bijan Machado Prenatal Labs: 
  
Lab Results Component Value Date/Time HBsAg, External Negative 2018 HIV, External Non-Reactive 2018 Rubella, External Immune 2018 Gonorrhea, External Negative 2018 Chlamydia, External Negative 2018 GrBStrep, External Negative 10/29/2018 Attending Attestation: I was present and scrubbed for the entire procedure  live male over IP. vtx delivered in CANDELARIO position. Infant placed on maternal abdomen with spontaneous vigorous crying. 3 vessel cord clamped x 2 by CNM then cut by JOSEPHINE after cessations of pulsations. Spontaneous delivery of intact placenta.  ml Signed By:  Ludivina Manjarrez CNM 2018

## 2018-11-07 VITALS
OXYGEN SATURATION: 100 % | TEMPERATURE: 98 F | DIASTOLIC BLOOD PRESSURE: 58 MMHG | SYSTOLIC BLOOD PRESSURE: 105 MMHG | HEIGHT: 64 IN | BODY MASS INDEX: 33.46 KG/M2 | WEIGHT: 196 LBS | HEART RATE: 76 BPM | RESPIRATION RATE: 15 BRPM

## 2018-11-07 PROBLEM — Z34.90 PREGNANCY: Status: ACTIVE | Noted: 2018-11-07

## 2018-11-07 PROCEDURE — 74011250637 HC RX REV CODE- 250/637: Performed by: ADVANCED PRACTICE MIDWIFE

## 2018-11-07 RX ORDER — IBUPROFEN 800 MG/1
800 TABLET ORAL EVERY 8 HOURS
Qty: 90 TAB | Refills: 3 | Status: SHIPPED | OUTPATIENT
Start: 2018-11-07 | End: 2021-03-03

## 2018-11-07 RX ADMIN — IBUPROFEN 800 MG: 400 TABLET ORAL at 07:56

## 2018-11-07 NOTE — LACTATION NOTE
Infant was transferred back from the NICU. Mom has been pumping and getting 5+ ml at each pumping. I demonstrated manual expression to mom and she did a return demonstration. Suggested to mom that she offer the breast frequentlly, and if infant doesn't latch, that she manually express and provide EBM. Infant was circumcised this morning and is sleepy at the breast despite multiple attempts to awaken and get him to latch. We provided him with 10 gtts EBM at this time. Mom gave formula earlier. I discussed with her the impact supplementation can have on breast milk production. Breasts may become engorged when milk \"comes in\". How milk is made / normal phases of milk production, supply and demand discussed. Taught care of engorged breasts - frequent breastfeeding encouraged, warm compresses and breast massage ac. Then nurse the baby or pump. Apply cold compresses pc x 15 minutes a few times a day for swelling or discomfort. May need to do this care for a couple of days. Discussed prevention and treatment of mastitis.

## 2018-11-07 NOTE — PROGRESS NOTES
1940 Bedside shift change report given to Randy Rascon (oncoming nurse) by Renata Sierra (offgoing nurse). Report included the following information SBAR, Kardex, MAR, and Recent Results. 2200 Pt informed that infant son Debbi Barbour is being transferred from NICU and will be rooming in. Both parents at bedside and are pleased. Report given to parents and plan of care discussed. 2213 Son now at bedside.

## 2018-11-07 NOTE — PROGRESS NOTES
Post-Partum Day Number 1 Progress Note Soni Huerta Assessment: Doing well, post partum day 1 Plan: 1. Continue routine postpartum and perineal care as well as maternal education. 2. circ today by Armond Burrell consent and procedure Information for the patient's :  Kirsten Mota [[id number]] Vaginal, Spontaneous Patient doing well without significant complaint. Voiding without difficulty, normal lochia. Vitals: 
Visit Vitals BP 99/60 (BP 1 Location: Right arm, BP Patient Position: At rest;Head of bed elevated (Comment degrees)) Pulse 73 Temp 98 °F (36.7 °C) Resp 14 Ht 5' 4\" (1.626 m) Wt 196 lb (88.9 kg) LMP 02/15/2018 SpO2 100% Breastfeeding? Unknown BMI 33.64 kg/m² Temp (24hrs), Av.9 °F (36.6 °C), Min:97.7 °F (36.5 °C), Max:98.1 °F (36.7 °C) Exam:   Patient without distress. Abdomen soft, fundus firm, nontender Perineum with normal lochia noted. Lower extremities are negative for swelling, cords or tenderness. Labs:  
 
Lab Results Component Value Date/Time WBC 7.0 2018 05:14 PM  
 WBC 7.6 2018 02:52 PM  
 WBC 7.2 2018 03:40 PM  
 HGB 8.9 (L) 2018 05:14 PM  
 HGB 10.3 (L) 2018 02:52 PM  
 HGB 11.2 2018 03:40 PM  
 HCT 28.4 (L) 2018 05:14 PM  
 HCT 31.4 (L) 2018 02:52 PM  
 HCT 34.0 2018 03:40 PM  
 PLATELET 717  05:14 PM  
 PLATELET 579  02:52 PM  
 PLATELET 268  03:40 PM  
 Hgb, External 10.3 2018 Hgb, External 11.2 2018 Hct, External 31.4 2018 Hct, External 34 2018 Platelet cnt., External 253 2018 Platelet cnt., External 271 2018 No results found for this or any previous visit (from the past 24 hour(s)).

## 2018-11-07 NOTE — DISCHARGE SUMMARY
Obstetrical Discharge Summary     Name: Sobeida Martínez MRN: [id number]  SSN: [id number]    YOB: 1995  Age: 20 y.o. Sex: female      Allergies: Ceftin [cefuroxime axetil]    Admit Date: 2018    Discharge Date: 2018     Admitting Provider: Kath Mancera CNM     Attending Provider: Kath Mancera CNM      * Admission Diagnoses: Pregnancy    * Discharge Diagnoses:   Information for the patient's :  Yomi Mckeon [[id number]]   Delivery of a 8 lb 11.3 oz (3.95 kg) male infant via Vaginal, Spontaneous on 2018 at 11:29 PM  by . Apgars were 8 and 9. Additional Diagnoses:   Hospital Problems as of 2018 Date Reviewed: 2018    None         Lab Results   Component Value Date/Time    Rubella, External Immune 2018    GrBStrep, External Negative 10/29/2018    ABO,Rh AB Positive 2018      There is no immunization history on file for this patient. * Procedures:   * No surgery found *      [place]  Depression Scale  I have been able to laugh and see the funny side of things: As much as I always could  I have looked forward with enjoyment to things: As much as I ever did  I have blamed myself unnecessarily when things went wrong: No, never  I have been anxious or worried for no good reason: No, not at all  I have felt scared or panicky for no very good reason: No, not at all  Things have been getting on top of me: No, I have been coping as well as ever  I have been so unhappy that I have had difficulty sleeping: No, not at all  I have felt sad or miserable: No, not at all  I have been so unhappy that I have been crying: No, never  The thought of harming myself has occurred to me: Never  Total Score: 0    * Discharge Condition: good    * Hospital Course: Normal hospital course following the delivery.     * Disposition: Home    Discharge Medications:   Current Discharge Medication List      START taking these medications    Details ibuprofen (MOTRIN) 800 mg tablet Take 1 Tab by mouth every eight (8) hours. Qty: 90 Tab, Refills: 3         CONTINUE these medications which have NOT CHANGED    Details   prenatal vits62/FA/om3/dha/epa (PRENATAL GUMMY PO) Take  by mouth. sertraline (ZOLOFT) 50 mg tablet Take  by mouth daily. STOP taking these medications       loratadine (CLARITIN) 10 mg tablet Comments:   Reason for Stopping:               * Follow-up Care/Patient Instructions:   Activity: Activity as tolerated  Diet: Regular Diet  Wound Care: Keep wound clean and dry    Follow-up Information     Follow up With Specialties Details Why Contact Info    None    None ([phone]) Patient stated that they have no PCP             Signed By:  Pauline Mora CNM     November 7, 2018

## 2018-11-07 NOTE — DISCHARGE INSTRUCTIONS
POSTPARTUM DISCHARGE INSTRUCTIONS       Name:  Brooklyn Good  YOB: 1995  Admission Diagnosis:  Pregnancy  Pregnancy     Discharge Diagnosis:    Problem List as of 2018 Date Reviewed: 2018          Codes Class Noted - Resolved    Pregnancy ICD-10-CM: Z34.90  ICD-9-CM: V22.2  2018 - Present        RESOLVED: 10 weeks gestation of pregnancy ICD-10-CM: Z3A.10  ICD-9-CM: V22.2  2018 - 2018    Overview Addendum 2018  6:61 AM by Siria Casas MD     Primary Provider: CNMs    EDC by lmp  Pertinents:  HX LGA babies 9+ pounds  IOB labs: pap NIL 18  Genetic Screening: Panorama- male low risk  Anatomy: FETAL ANATOMY WAS WELL VISUALIZED AND APPEARS WNL. NO ABNORMALITIES WERE SEEN ON TODAYS EXAM.  APPROPRIATE GROWTH MEASURED. SIZE = DATES. MARIA E AND CERVIX APPEAR WITHIN NORMAL LIMITS. GENDER: MALE  CORD INSERTION INTO PLACENTA APPEARS MARGINAL. THERE APPEARS TO BE HYPERECHOIC AREA WITHIN THE LEFT VENTRICLE OF THE FETAL HEART, POSSIBLE  LEFT ECHOGENIC FOCI; fu US every 4 weeks  Polyhydramnios; MARIA E 26 at 37weeks  Measuring 72% 10/1/18- hx large baby  GTT: 28 weeks 109  Flu: declined  18  TDAP: declined 18  Rhogam: n/a  Third Tri Labs: Hgb 10.3  GBS: Negative    Pain mgmt. in labor: epidural   Feeding: breast  Circ:  Social: Lives in [place]                   Attending Physician:  Siria Casas MD    Delivery Type:  Vaginal Childbirth with Episiotomy, Laceration or Tear: What To Expect At [address] [place] [address] body will slowly heal in the next few weeks. It is easy to get too tired and overwhelmed during the first weeks after your baby is born. Changes in your hormones can shift your mood without warning. You may find it hard to meet the extra demands on your energy and time. Take it easy on yourself. Follow-up care is a key part of your treatment and safety.  Be sure to make and go to all appointments, and call your doctor if you are having problems. It's also a good idea to know your test results and keep a list of the medicines you take. How can you care for yourself at home? Vaginal Bleeding and Cramps  · After delivery, you will have a bloody discharge from the vagina. This will turn pink within a week and then white or yellow after about 10 days. It may last for 2 to 4 weeks or longer, until the uterus has healed. Use pads instead of tampons until you stop bleeding. · Do not worry if you pass some blood clots, as long as they are smaller than a golf ball. If you have a tear or stitches in your vaginal area, change the pad at least every 4 hours to prevent soreness and infection. · You may have cramps for the first few days after childbirth. These are normal and occur as the uterus shrinks to normal size. Take an over-the-counter pain medicine, such as acetaminophen (Tylenol), ibuprofen (Advil, Motrin), or naproxen (Aleve), for cramps. Read and follow all instructions on the label. Do not take aspirin, because it can cause more bleeding. Do not take acetaminophen (Tylenol) and other acetaminophen containing medications (i.e. Percocet) at the same time. Episiotomy, Lacerations or Tears  · If you have stitches, they will dissolve on their own and do not need to be removed. · Put ice or a cold pack on your painful area for 10 to 20 minutes at a time, several times a day, for the first few days. Put a thin cloth between the ice and your skin. · Sit in a few inches of warm water (sitz bath) 3 times a day and after bowel movements. The warm water helps with pain and itching. If you do not have a tub, a warm shower might help. Breast fullness  · Your breasts may overfill (engorge) in the first few days after delivery. To help milk flow and to relieve pain, warm your breasts in the shower or by using warm, moist towels before nursing. · If you are not nursing, do not put warmth on your breasts or touch your breasts.  Wear a tight bra or sports bra and use ice until the fullness goes away. This usually takes 2 to 3 days. · Put ice or a cold pack on your breast after nursing to reduce swelling and pain. Put a thin cloth between the ice and your skin. Activity  · Eat a balanced diet. Do not try to lose weight by cutting calories. Keep taking your prenatal vitamins, or take a multivitamin. · Get as much rest as you can. Try to take naps when your baby sleeps during the day. · Get some exercise every day. But do not do any heavy exercise until your doctor says it is okay. · Wait until you are healed (about 4 to 6 weeks) before you have sexual intercourse. Your doctor will tell you when it is okay to have sex. · Talk to your doctor about birth control. You can get pregnant even before your period returns. Also, you can get pregnant while you are breast-feeding. Mental Health  · Many women get the \"baby blues\" during the first few days after childbirth. You may lose sleep, feel irritable, and cry easily. You may feel happy one minute and sad the next. Hormone changes are one cause of these emotional changes. Also, the demands of a new baby, along with visits from relatives or other family needs, add to a mother's stress. The \"baby blues\" often peak around the fourth day. Then they ease up in less than 2 weeks. · If your moodiness or anxiety lasts for more than 2 weeks, or if you feel like life is not worth living, you may have postpartum depression. This is different for each mother. Some mothers with serious depression may worry intensely about their infant's well-being. Others may feel distant from their child. Some mothers might even feel that they might harm their baby. A mother may have signs of paranoia, wondering if someone is watching her. · With all the changes in your life, you may not know if you are depressed. Pregnancy sometimes causes changes in how you feel that are similar to the symptoms of depression.   · Symptoms of depression include:  · Feeling sad or hopeless and losing interest in daily activities. These are the most common symptoms of depression. · Sleeping too much or not enough. · Feeling tired. You may feel as if you have no energy. · Eating too much or too little. · POSTPARTUM [employer] (PSI) offers a Warm line; Chat with the Expert phone sessions; Information and Articles about Pregnancy and Postpartum Mood Disorders; Comprehensive List of Free Support Groups; Knowledgeable local coordinators who will offer support, information, and resources; Guide to Resources on OFERTALDIA; Calendar of events in the  mood disorders community; Latest News and Research; and [facility] [address] for [employer]. Remember - You are not alone; You are not to blame; With help, you will be well. 4-243-090-PPD(7213). WWW. POSTPARTUM. NET    · Writing or talking about death, such as writing suicide notes or talking about guns, knives, or pills. Keep the numbers for these national suicide hotlines: 0-060-701-TALK (5-579.138.8299) and 8-926-CZPINBN (2-851.217.1752). If you or someone you know talks about suicide or feeling hopeless, get help right away. Constipation and Hemorrhoids  Drink plenty of fluids, enough so that your urine is light yellow or clear like water. If you have kidney, heart, or liver disease and have to limit fluids, talk with your doctor before you increase the amount of fluids you drink. · Eat plenty of fiber each day. Have a bran muffin or bran cereal for breakfast, and try eating a piece of fruit for a mid-afternoon snack. · For painful, itchy hemorrhoids, put ice or a cold pack on the area several times a day for 10 minutes at a time. Follow this by putting a warm compress on the area for another 10 to 20 minutes or by sitting in a shallow, warm bath. When should you call for help? Call 911 anytime you think you may need emergency care.  For example, call if:  · You are thinking of hurting yourself, your baby, or anyone else. · You passed out (lost consciousness). · You have symptoms of a blood clot in your lung (called a pulmonary embolism). These may include:  · Sudden chest pain. · Trouble breathing. · Coughing up blood. Call your doctor now or seek immediate medical care if:  · You have severe vaginal bleeding. · You are soaking through a pad each hour for 2 or more hours. · Your vaginal bleeding seems to be getting heavier or is still bright red 4 days after delivery. · You are dizzy or lightheaded, or you feel like you may faint. · You are vomiting or cannot keep fluids down. · You have a fever. · You have new or more belly pain. · You pass tissue (not just blood). · Your vaginal discharge smells bad. · Your belly feels tender or full and hard. · Your breasts are continuously painful or red. · You feel sad, anxious, or hopeless for more than a few days. · You have sudden, severe pain in your belly. · You have symptoms of a blood clot in your leg (called a deep vein thrombosis), such as:  · Pain in your calf, back of the knee, thigh, or groin. · Redness and swelling in your leg or groin. · You have symptoms of preeclampsia, such as:  · Sudden swelling of your face, hands, or feet. · New vision problems (such as dimness or blurring). · A severe headache. · Your blood pressure is higher than it should be or rises suddenly. · You have new nausea or vomiting. Watch closely for changes in your health, and be sure to contact your doctor if you have any problems. Additional Information:  Postpartum Support    PARENTS:  Are you feeling sad or depressed? Is it difficult for you to enjoy yourself? Do you feel more irritable or tense? Do you feel anxious or panicky? Are you having difficulty bonding with your baby? Do you feel as if you are \"out of control\" or \"going crazy\"? Are you worried that you might hurt your baby or yourself?       FAMILIES: Do you worry that something is wrong but don't know how to help? Do you think that your partner or spouse is having problems coping? Are you worried that it may never get better? While many women experience some mild mood change or \"the blues\" during or after the birth of a child, 1 in 9 women experience more significant symptoms of depression or anxiety. 1 in 10 Dads become depressed during the first year. Things you can do  Being a good parent includes taking care of yourself. If you take care of yourself, you will be able to take better care of your baby and your family. · Talk to a counselor or healthcare provider who has training in  mood and anxiety problems. · Learn as much as you can about pregnancy and postpartum depression and anxiety. · Get support from family and friends. Ask for help when you need it. · Join a support group in your area or online. · Keep active by walking, stretching or whatever form of exercise helps you to feel better. · Get enough rest and time for yourself. · Eat a healthy diet. · Don't give up! It may take more than one try to get the right help you need. These are general instructions for a healthy lifestyle:    No smoking/ No tobacco products/ Avoid exposure to second hand smoke    Surgeon General's Warning:  Quitting smoking now greatly reduces serious risk to your health. Obesity, smoking, and sedentary lifestyle greatly increases your risk for illness    A healthy diet, regular physical exercise & weight monitoring are important for maintaining a healthy lifestyle    Recognize signs and symptoms of STROKE:    F-face looks uneven    A-arms unable to move or move unevenly    S-speech slurred or non-existent    T-time-call 911 as soon as signs and symptoms begin - DO NOT go       back to bed or wait to see if you get better - TIME IS BRAIN. I have had the opportunity to make my options or choices for discharge.  I have received and understand these instructions.

## 2018-12-18 ENCOUNTER — OFFICE VISIT (OUTPATIENT)
Dept: OBGYN CLINIC | Age: 23
End: 2018-12-18

## 2018-12-18 VITALS — BODY MASS INDEX: 28.1 KG/M2 | WEIGHT: 164.6 LBS | HEIGHT: 64 IN

## 2018-12-18 NOTE — PROGRESS NOTES
Postpartum evaluation    Dawson Sanchez is a 20 y.o. female who presents for a postpartum exam.     Delivery of a 8 lb 11.3 oz (3.95 kg) male infant via Vaginal, Spontaneous on 11/5/2018    Her baby is doing well. 'Clare Dennison'    She has had no menses since delivery. She has had the following significant problems since her delivery: none    The patient is breast feeding without difficulty. The patient would like to use natural family planning for birth control. She is currently taking: Zoloft 100 mg. She is due for her next AE in 3-6 months. Visit Vitals  Ht 5' 4\" (1.626 m)   Wt 164 lb 9.6 oz (74.7 kg)   Breastfeeding?  Yes   BMI 28.25 kg/m²       PHYSICAL EXAMINATION    Constitutional  · Appearance: well-nourished, well developed, alert, in no acute distress    HENT  · Head and Face: appears normal    Neck  · Inspection/Palpation: normal appearance, no masses or tenderness  · Lymph Nodes: no lymphadenopathy present  · Thyroid: gland size normal, nontender, no nodules or masses present on palpation    Breasts  · Inspection of Breasts: breasts symmetrical, no skin changes, no discharge present, nipple appearance normal, no skin retraction present  · Palpation of Breasts and Axillae: no masses present on palpation, no breast tenderness  · Axillary Lymph Nodes: no lymphadenopathy present    Gastrointestinal  · Abdominal Examination: abdomen non-tender to palpation, normal bowel sounds, no masses present  · Liver and spleen: no hepatomegaly present, spleen not palpable  · Hernias: no hernias identified    Genitourinary  · External Genitalia: normal appearance for age, no discharge present, no tenderness present, no inflammatory lesions present, no masses present, no atrophy present  · Vagina: normal vaginal vault without central or paravaginal defects, no discharge present, no inflammatory lesions present, no masses present  · Bladder: non-tender to palpation  · Urethra: appears normal  · Cervix: normal   · Uterus: normal size, shape and consistency  · Adnexa: no adnexal tenderness present, no adnexal masses present  · Perineum: perineum within normal limits, no evidence of trauma, no rashes or skin lesions present  · Anus: anus within normal limits, no hemorrhoids present  · Inguinal Lymph Nodes: no lymphadenopathy present    Skin  · General Inspection: no rash, no lesions identified    Neurologic/Psychiatric  · Mental Status:  · Orientation: grossly oriented to person, place and time  · Mood and Affect: mood normal, affect appropriate  · EPDS- 7     Assessment:  Normal postpartum check- may resume normal pre pregnant activities         Plan:  RTO for AE. 3-6  Follow up with psychiatrist for zoloft in one month    Gordon Lanza CNM

## 2018-12-18 NOTE — PATIENT INSTRUCTIONS
After Your Delivery (the Postpartum Period): Care Instructions  Your Care Instructions    Congratulations on the birth of your baby. Like pregnancy, the  period can be a time of excitement, evens, and exhaustion. You may look at your wondrous little baby and feel happy. You may also be overwhelmed by your new sleep hours and new responsibilities. At first, babies often sleep during the days and are awake at night. They do not have a pattern or routine. They may make sudden gasps, jerk themselves awake, or look like they have crossed eyes. These are all normal, and they may even make you smile. In these first weeks after delivery, try to take good care of yourself. It may take 4 to 6 weeks to feel like yourself again, and possibly longer if you had a  birth. You will likely feel very tired for several weeks. Your days will be full of ups and downs, but lots of evens as well. Follow-up care is a key part of your treatment and safety. Be sure to make and go to all appointments, and call your doctor if you are having problems. It's also a good idea to know your test results and keep a list of the medicines you take. How can you care for yourself at home? Take care of your body after delivery  · Use pads instead of tampons for the bloody flow that may last as long as 2 weeks. · Ease cramps with ibuprofen (Advil, Motrin). · Ease soreness of hemorrhoids and the area between your vagina and rectum with ice compresses or witch hazel pads. · Ease constipation by drinking lots of fluid and eating high-fiber foods. Ask your doctor about over-the-counter stool softeners. · Cleanse yourself with a gentle squeeze of warm water from a bottle instead of wiping with toilet paper. · Take a sitz bath in warm water several times a day. · Wear a good nursing bra. Ease sore and swollen breasts with warm, wet washcloths. · If you are not breastfeeding, use ice rather than heat for breast soreness.   · Your period may not start for several months if you are breastfeeding. You may bleed more, and longer at first, than you did before you got pregnant. · Wait until you are healed (about 4 to 6 weeks) before you have sexual intercourse. Your doctor will tell you when it is okay to have sex. · Try not to travel with your baby for 5 or 6 weeks. If you take a long car trip, make frequent stops to walk around and stretch. Avoid exhaustion  · Rest every day. Try to nap when your baby naps. · Ask another adult to be with you for a few days after delivery. · Plan for  if you have other children. · Stay flexible so you can eat at odd hours and sleep when you need to. Both you and your baby are making new schedules. · Plan small trips to get out of the house. Change can make you feel less tired. · Ask for help with housework, cooking, and shopping. Remind yourself that your job is to care for your baby. Know about help for postpartum depression  · \"Baby blues\" are common for the first 1 to 2 weeks after birth. You may cry or feel sad or irritable for no reason. · Rest whenever you can. Being tired makes it harder to handle your emotions. · Go for walks with your baby. · Talk to your partner, friends, and family about your feelings. · If your symptoms last for more than a few weeks, or if you feel very depressed, ask your doctor for help. · Postpartum depression can be treated. Support groups and counseling can help. Sometimes medicine can also help. Stay healthy  · Eat healthy foods so you have more energy and lose extra baby pounds. · If you breastfeed, avoid drugs. If you quit smoking during pregnancy, try to stay smoke-free. If you choose to have a drink now and then, have only one drink, and limit the number of occasions that you have a drink. Wait to breastfeed at least 2 hours after you have a drink to reduce the amount of alcohol the baby may get in the milk.   · Start daily exercise after 4 to 6 weeks, but rest when you feel tired. · Learn exercises to tone your belly. Do Kegel exercises to regain strength in your pelvic muscles. You can do these exercises while you stand or sit. ? Squeeze the same muscles you would use to stop your urine. Your belly and thighs should not move. ? Hold the squeeze for 3 seconds, and then relax for 3 seconds. ? Start with 3 seconds. Then add 1 second each week until you are able to squeeze for 10 seconds. ? Repeat the exercise 10 to 15 times for each session. Do three or more sessions each day. · Find a class for new mothers and new babies that has an exercise time. · If you had a  birth, give yourself a bit more time before you exercise, and be careful. When should you call for help? Call 911 anytime you think you may need emergency care. For example, call if:    · You passed out (lost consciousness).    Call your doctor now or seek immediate medical care if:    · You have severe vaginal bleeding. This means you are passing blood clots and soaking through a pad each hour for 2 or more hours.     · You are dizzy or lightheaded, or you feel like you may faint.     · You have a fever.     · You have new belly pain, or your pain gets worse.    Watch closely for changes in your health, and be sure to contact your doctor if:    · Your vaginal bleeding seems to be getting heavier.     · You have new or worse vaginal discharge.     · You feel sad, anxious, or hopeless for more than a few days.     · You do not get better as expected. Where can you learn more? Go to http://fabiano-kris.info/. Enter A461 in the search box to learn more about \"After Your Delivery (the Postpartum Period): Care Instructions. \"  Current as of: 2017  Content Version: 11.8  © 1579-9910 [employer]. Care instructions adapted under license by [employer] (which disclaims liability or warranty for this information).  If you have questions about a medical condition or this instruction, always ask your healthcare professional. [place] [address] any warranty or liability for your use of this information.

## 2019-01-19 ENCOUNTER — TELEPHONE (OUTPATIENT)
Dept: OBGYN CLINIC | Age: 24
End: 2019-01-19

## 2019-01-19 RX ORDER — DICLOXACILLIN SODIUM 250 MG/1
500 CAPSULE ORAL 4 TIMES DAILY
Qty: 40 CAP | Refills: 0 | Status: SHIPPED | OUTPATIENT
Start: 2019-01-19 | End: 2021-03-03

## 2019-01-19 NOTE — TELEPHONE ENCOUNTER
Pt called- her right breast is very painful starting last night and increasing in pain, red area now extending from nipple to arm pit, with hot and cold flashes, pt does not have a thermometer in house. Has applied hot compresses and expressed milk multiple times with worsening symptoms. Dycloxicillin 500 mg QID sent to pharmacy on record.

## 2019-01-21 NOTE — TELEPHONE ENCOUNTER
Patient called, cannot come to the office today as she is in [facility] and will not make it before we close. Patient scheduled appt for tomorrow morning with Naina Hodge.

## 2019-01-22 ENCOUNTER — OFFICE VISIT (OUTPATIENT)
Dept: OBGYN CLINIC | Age: 24
End: 2019-01-22

## 2019-01-22 VITALS
DIASTOLIC BLOOD PRESSURE: 92 MMHG | SYSTOLIC BLOOD PRESSURE: 130 MMHG | BODY MASS INDEX: 27.66 KG/M2 | WEIGHT: 162 LBS | HEIGHT: 64 IN

## 2019-01-22 DIAGNOSIS — O92.79 PAIN AGGRAVATED BY BREAST FEEDING: Primary | ICD-10-CM

## 2019-01-22 DIAGNOSIS — R52 PAIN AGGRAVATED BY BREAST FEEDING: Primary | ICD-10-CM

## 2019-01-22 NOTE — PROGRESS NOTES
Chief Complaint   Breast pain      JAYSON Rico is a 20 y.o. female who presents for the evaluation of left sided breast pain. No LMP recorded. Fever/chills over the weekend  Hurts to nurse  Given Dicloxacillin 1/19      Past Medical History:   Diagnosis Date    Anemia     Asthma     Postpartum depression     depression and anxiety     Past Surgical History:   Procedure Laterality Date    HX OTHER SURGICAL  2011    10 ear surgeries, tubes, regraft ear drum    HX TYMPANOSTOMY       Social History     Occupational History    Not on file   Tobacco Use    Smoking status: Never Smoker    Smokeless tobacco: Never Used   Substance and Sexual Activity    Alcohol use: No    Drug use: No    Sexual activity: Yes     Partners: Male     Birth control/protection: None     Family History   Problem Relation Age of Onset    Other Sister         Heart condition- 'hole' in heart    Colon Cancer Maternal Grandmother     No Known Problems Mother     No Known Problems Father        Allergies   Allergen Reactions    Ceftin [Cefuroxime Axetil] Hives     Prior to Admission medications    Medication Sig Start Date End Date Taking? Authorizing Provider   dicloxacillin (DYNAPEN) 250 mg capsule Take 2 Caps by mouth four (4) times daily. 1/19/19   Jaleesa Hawkins CNM   ibuprofen (MOTRIN) 800 mg tablet Take 1 Tab by mouth every eight (8) hours. 11/7/18   Jaleesa Hawkins CNM   prenatal vits62/FA/om3/dha/epa (PRENATAL GUMMY PO) Take  by mouth. Provider, Historical   sertraline (ZOLOFT) 50 mg tablet Take  by mouth daily.     Provider, Historical        Review of Systems: History obtained from the patient  Constitutional: negative for weight loss, fever, night sweats  Breast: negative for breast lumps, nipple discharge, galactorrhea  GI: negative for change in bowel habits, abdominal pain, black or bloody stools  : negative for frequency, dysuria, hematuria, vaginal discharge  MSK: negative for back pain, joint pain, muscle pain  Skin: negative for itching, rash, hives  Psych: negative for anxiety, depression, change in mood      Objective:  Visit Vitals  BP (!) 130/92   Ht 5' 4\" (1.626 m)   Wt 162 lb (73.5 kg)   Breastfeeding? Yes   BMI 27.81 kg/m²       Physical Exam:   PHYSICAL EXAMINATION    Constitutional  · Appearance: well-nourished, well developed, alert, in no acute distress  Breast  Left breast with reported redness and tenderness over the weekend  NO abnormal findings today on exam except tenderness    Skin  · General Inspection: no rash, no lesions identified    Neurologic/Psychiatric  · Mental Status:  · Orientation: grossly oriented to person, place and time  · Mood and Affect: mood normal, affect appropriate    Assessment:   Currently being treated for suspected mastitis with resolving symptoms    Plan:   Education regarding continued nursing, finish medication course  Tylenol/motrin for discomfort      RTO prn if symptoms persist or worsen. Instructions given to pt. Handouts given to pt.

## 2019-04-29 ENCOUNTER — OFFICE VISIT (OUTPATIENT)
Dept: OBGYN CLINIC | Age: 24
End: 2019-04-29

## 2019-04-29 VITALS
SYSTOLIC BLOOD PRESSURE: 120 MMHG | BODY MASS INDEX: 26.6 KG/M2 | WEIGHT: 155.8 LBS | HEIGHT: 64 IN | DIASTOLIC BLOOD PRESSURE: 76 MMHG

## 2019-04-29 DIAGNOSIS — Z01.419 WELL WOMAN EXAM WITH ROUTINE GYNECOLOGICAL EXAM: ICD-10-CM

## 2019-04-29 DIAGNOSIS — F32.89 OTHER DEPRESSION: ICD-10-CM

## 2019-04-29 DIAGNOSIS — F41.9 ANXIETY: ICD-10-CM

## 2019-04-29 RX ORDER — SERTRALINE HYDROCHLORIDE 50 MG/1
100 TABLET, FILM COATED ORAL DAILY
Qty: 90 TAB | Refills: 3 | Status: SHIPPED | OUTPATIENT
Start: 2019-04-29 | End: 2020-04-21 | Stop reason: ALTCHOICE
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
## 2019-04-29 NOTE — PATIENT INSTRUCTIONS

## 2019-05-01 LAB
CYTOLOGIST CVX/VAG CYTO: NORMAL
CYTOLOGY CVX/VAG DOC THIN PREP: NORMAL
DX ICD CODE: NORMAL
LABCORP, 190119: NORMAL
Lab: NORMAL
Lab: NORMAL
OTHER STN SPEC: NORMAL
PATH REPORT.FINAL DX SPEC: NORMAL
STAT OF ADQ CVX/VAG CYTO-IMP: NORMAL

## 2019-05-28 ENCOUNTER — OFFICE VISIT (OUTPATIENT)
Dept: OBGYN CLINIC | Age: 24
End: 2019-05-28

## 2019-05-28 ENCOUNTER — TELEPHONE (OUTPATIENT)
Dept: OBGYN CLINIC | Age: 24
End: 2019-05-28

## 2019-05-28 VITALS
HEIGHT: 64 IN | WEIGHT: 154 LBS | SYSTOLIC BLOOD PRESSURE: 104 MMHG | BODY MASS INDEX: 26.29 KG/M2 | DIASTOLIC BLOOD PRESSURE: 66 MMHG

## 2019-05-28 DIAGNOSIS — N83.209 OVARIAN CYST RUPTURE: Primary | ICD-10-CM

## 2019-05-28 RX ORDER — TRAMADOL HYDROCHLORIDE 50 MG/1
50 TABLET ORAL
Qty: 16 TAB | Refills: 0 | Status: SHIPPED | OUTPATIENT
Start: 2019-05-28 | End: 2019-06-01

## 2019-05-28 NOTE — PROGRESS NOTES
Chief Complaint   No chief complaint on file. HPI  Sydney Velez is a 20 y.o. female who presents for the evaluation of RLQ pain since Saturday. No LMP recorded. US today showed:  THE UTERUS IS ANTEVERTED,NORMAL IN SIZE AND ECHOGENICITY. THE ENDOMETRIAL LINING 8 MM IN THICKNESS. NO MASSES OR ABNORMALITIES ARE SEEN. THE RIGHT OVARY APPEARS WNL. THE LEFT OVARY APPEARS WNL. A SMALL AMOUNT OF FREE FLUID IS VISUALIZED IN THE POSTERIOR CDS. Past Medical History:   Diagnosis Date    Anemia     Asthma     Postpartum depression     depression and anxiety     Past Surgical History:   Procedure Laterality Date    HX OTHER SURGICAL  2011    10 ear surgeries, tubes, regraft ear drum    HX TYMPANOSTOMY       Social History     Occupational History    Not on file   Tobacco Use    Smoking status: Never Smoker    Smokeless tobacco: Never Used   Substance and Sexual Activity    Alcohol use: No    Drug use: No    Sexual activity: Yes     Partners: Male     Birth control/protection: None     Family History   Problem Relation Age of Onset    Other Sister         Heart condition- 'hole' in heart    Colon Cancer Maternal Grandmother         Diagnosed in 63's    No Known Problems Mother     No Known Problems Father        Allergies   Allergen Reactions    Ceftin [Cefuroxime Axetil] Hives     Prior to Admission medications    Medication Sig Start Date End Date Taking? Authorizing Provider   sertraline (ZOLOFT) 50 mg tablet Take 2 Tabs by mouth daily. 4/29/19   Bozena Rosedale, CNM   dicloxacillin (DYNAPEN) 250 mg capsule Take 2 Caps by mouth four (4) times daily. 1/19/19   Bozena Rosedale, CNM   ibuprofen (MOTRIN) 800 mg tablet Take 1 Tab by mouth every eight (8) hours. 11/7/18   Bozena Rosedale, CNM   prenatal vits62/FA/om3/dha/epa (PRENATAL GUMMY PO) Take  by mouth.     Provider, Historical        Review of Systems: History obtained from the patient  Constitutional: negative for weight loss, fever, night sweats  Breast: negative for breast lumps, nipple discharge, galactorrhea  GI: negative for change in bowel habits, abdominal pain, black or bloody stools  : negative for frequency, dysuria, hematuria, vaginal discharge  MSK: negative for back pain, joint pain, muscle pain  Skin: negative for itching, rash, hives  Psych: negative for anxiety, depression, change in mood      Objective:  Visit Vitals  Ht 5' 4\" (1.626 m)   Wt 154 lb (69.9 kg)   Breastfeeding? Yes   BMI 26.43 kg/m²       Physical Exam:   PHYSICAL EXAMINATION    Constitutional  · Appearance: well-nourished, well developed, alert, in no acute distress    Gastrointestinal  · Abdominal Examination: abdomen non-tender to palpation, normal bowel sounds, no masses present- tenderness lower abdomen  · Liver and spleen: no hepatomegaly present, spleen not palpable  · Hernias: no hernias identified    Genitourinary  · deferred    Skin  · General Inspection: no rash, no lesions identified    Neurologic/Psychiatric  · Mental Status:  · Orientation: grossly oriented to person, place and time  · Mood and Affect: mood normal, affect appropriate    Assessment/Plan:  Appears to be ruptured ovarian cyst   Discussed mini pill versus MADHAV- pt breast feeding- discussed concerns with MADHAV/mini pill with breast feeding pt unsure at this time. Will call back if desires     RX tramadol for pain control disp #16  RTO prn if symptoms persist or worsen.     Stefanie Alexandre, CNM

## 2019-05-28 NOTE — TELEPHONE ENCOUNTER
Call received at 823am    20year old patient last seen in the office for post partum visit on 4/29/19. Patient calling to say that since 5/25/19 pm she has been having right side pain and now across her lower back. Patient denies vaginal bleeding, temperature, and burning upon urination. Patient reports the pain to be at 6-7 on the pain scale of 1-10. Patient has tried ibuprofen with out relief. This nurse spoke to nurse mid wife James Caputo . Patient advised of need for ultrasound and follow up . Nurse mid wife James Caputo advised of open ultrasound and that she could be seen by another provider in the office but that she was booked. Patient states she is uncomfortable with seeing a male MD.      Patient currently scheduled for 2 :00PM ultrasound and ? Ok to double book to be seen by you. ( Leandra Perea) for follow up. Patient only has transportation for today. Patient last seen on 5/18/17  Patient is a traveling nurse.    Please advise RF

## 2019-05-28 NOTE — TELEPHONE ENCOUNTER
Patient appointment confirmed and patient advised that she is being worked in and will have a wait time. Patient verbalized understanding.

## 2019-05-28 NOTE — PATIENT INSTRUCTIONS
Pelvic Pain: Care Instructions  Your Care Instructions    Pelvic pain, or pain in the lower belly, can have many causes. Often pelvic pain is not serious and gets better in a few days. If your pain continues or gets worse, you may need tests and treatment. Tell your doctor about any new symptoms. These may be signs of a serious problem. Follow-up care is a key part of your treatment and safety. Be sure to make and go to all appointments, and call your doctor if you are having problems. It's also a good idea to know your test results and keep a list of the medicines you take. How can you care for yourself at home? · Rest until you feel better. Lie down, and raise your legs by placing a pillow under your knees. · Drink plenty of fluids. You may find that small, frequent sips are easier on your stomach than if you drink a lot at once. Avoid drinks with carbonation or caffeine, such as soda pop, tea, or coffee. · Try eating several small meals instead of 2 or 3 large ones. Eat mild foods, such as rice, dry toast or crackers, bananas, and applesauce. Avoid fatty and spicy foods, other fruits, and alcohol until 48 hours after your symptoms have gone away. · Take an over-the-counter pain medicine, such as acetaminophen (Tylenol), ibuprofen (Advil, Motrin), or naproxen (Aleve). Read and follow all instructions on the label. · Do not take two or more pain medicines at the same time unless the doctor told you to. Many pain medicines have acetaminophen, which is Tylenol. Too much acetaminophen (Tylenol) can be harmful. · You can put a heating pad, a warm cloth, or moist heat on your belly to relieve pain. When should you call for help?   Call your doctor now or seek immediate medical care if:    · You have a new or higher fever.     · You have unusual vaginal bleeding.     · You have new or worse belly or pelvic pain.     · You have vaginal discharge that has increased in amount or smells bad.    Watch closely for changes in your health, and be sure to contact your doctor if:    · You do not get better as expected. Where can you learn more? Go to http://fabiano-kris.info/. Enter [phone] in the search box to learn more about \"Pelvic Pain: Care Instructions. \"  Current as of: May 14, 2018  Content Version: 11.9  © 8715-0393 [employer]. Care instructions adapted under license by [employer] (which disclaims liability or warranty for this information). If you have questions about a medical condition or this instruction, always ask your healthcare professional. [place] [address] any warranty or liability for your use of this information.

## 2019-05-28 NOTE — TELEPHONE ENCOUNTER
MD Jaren Huggins Ardyce Mates, RN   Caller: Unspecified (Today, 10:19 AM)      Ninfa Zhu has agreed to double book and see this patient    Previous Messages

## 2019-08-02 ENCOUNTER — OFFICE VISIT (OUTPATIENT)
Dept: OBGYN CLINIC | Age: 24
End: 2019-08-02

## 2019-08-02 VITALS
HEIGHT: 64 IN | SYSTOLIC BLOOD PRESSURE: 118 MMHG | WEIGHT: 161.4 LBS | DIASTOLIC BLOOD PRESSURE: 70 MMHG | BODY MASS INDEX: 27.55 KG/M2

## 2019-08-02 DIAGNOSIS — Z30.9 ENCOUNTER FOR CONTRACEPTIVE MANAGEMENT, UNSPECIFIED TYPE: Primary | ICD-10-CM

## 2019-08-02 RX ORDER — ACETAMINOPHEN AND CODEINE PHOSPHATE 120; 12 MG/5ML; MG/5ML
1 SOLUTION ORAL DAILY
Qty: 3 PACKAGE | Refills: 4 | Status: SHIPPED | OUTPATIENT
Start: 2019-08-02 | End: 2020-04-21 | Stop reason: ALTCHOICE

## 2019-08-02 NOTE — PROGRESS NOTES
Contraception evaluation/followup    The patient is a 20 y.o., , pt is currently on her cycle. Pt has hx of ovarian cyst mid cycle that rupture causing her pain. Pt states she has regular cycles with breast feeding and is having cyst like pain mid cycle with most cycles. Requesting birth control for contraception as well as cyst control. This treatment has worked for her in the past.      Past Medical History:   Diagnosis Date    Anemia     Asthma     Postpartum depression     depression and anxiety        Past Surgical History:   Procedure Laterality Date    HX OTHER SURGICAL  2011    10 ear surgeries, tubes, regraft ear drum    HX TYMPANOSTOMY       Social History     Occupational History    Not on file   Tobacco Use    Smoking status: Never Smoker    Smokeless tobacco: Never Used   Substance and Sexual Activity    Alcohol use: No    Drug use: No    Sexual activity: Yes     Partners: Male     Birth control/protection: None     Family History   Problem Relation Age of Onset    Other Sister         Heart condition- 'hole' in heart    Colon Cancer Maternal Grandmother         Diagnosed in 's    No Known Problems Mother     No Known Problems Father        Allergies   Allergen Reactions    Ceftin [Cefuroxime Axetil] Hives     Prior to Admission medications    Medication Sig Start Date End Date Taking? Authorizing Provider   sertraline (ZOLOFT) 50 mg tablet Take 2 Tabs by mouth daily. 19   Greg Rodriguez CNM   dicloxacillin (DYNAPEN) 250 mg capsule Take 2 Caps by mouth four (4) times daily. 19   Greg Rodriguez CNM   ibuprofen (MOTRIN) 800 mg tablet Take 1 Tab by mouth every eight (8) hours. 18   Greg Rodriguez CNM   prenatal vits62/FA/om3/dha/epa (PRENATAL GUMMY PO) Take  by mouth.     Provider, Historical        Review of Systems - History obtained from the patient  Constitutional: negative for weight loss, fever, night sweats  HEENT: negative for hearing loss, earache, congestion, snoring, sorethroat  CV: negative for chest pain, palpitations, edema  Resp: negative for cough, shortness of breath, wheezing  Breast: negative for breast lumps, nipple discharge, galactorrhea  GI: negative for change in bowel habits, abdominal pain, black or bloody stools  : negative for frequency, dysuria, hematuria  MSK: negative for back pain, joint pain, muscle pain  Skin: negative for itching, rash, hives  Neuro: negative for dizziness, headache, confusion, weakness  Psych: negative for anxiety, depression, change in mood  Heme/lymph: negative for bleeding, bruising, pallor      Objective:    Visit Vitals  Ht 5' 4\" (1.626 m)   BMI 26.43 kg/m²          PHYSICAL EXAMINATION    Constitutional  · Appearance: well-nourished, well developed, alert, in no acute distress    HENT  · Head and Face: appears normal      Skin  · General Inspection: no rash, no lesions identified    Neurologic/Psychiatric  · Mental Status:  · Orientation: grossly oriented to person, place and time  · Mood and Affect: mood normal, affect appropriate    Assessment/Plan:  Ovarian cyst- management with OCP, breastfeeding - will try micronor   Follow up in 3 months    Arsh Kincaid CNM

## 2020-02-10 ENCOUNTER — OFFICE VISIT (OUTPATIENT)
Dept: OBGYN CLINIC | Age: 25
End: 2020-02-10

## 2020-02-10 VITALS
WEIGHT: 171 LBS | DIASTOLIC BLOOD PRESSURE: 70 MMHG | SYSTOLIC BLOOD PRESSURE: 112 MMHG | BODY MASS INDEX: 29.19 KG/M2 | HEIGHT: 64 IN

## 2020-02-10 DIAGNOSIS — N83.202 CYST OF LEFT OVARY: Primary | ICD-10-CM

## 2020-02-10 RX ORDER — NORGESTIMATE AND ETHINYL ESTRADIOL 0.25-0.035
1 KIT ORAL DAILY
Qty: 3 PACKAGE | Refills: 3 | Status: SHIPPED | OUTPATIENT
Start: 2020-02-10 | End: 2020-04-21 | Stop reason: DRUGHIGH

## 2020-02-10 RX ORDER — TRAZODONE HYDROCHLORIDE 50 MG/1
TABLET ORAL
COMMUNITY
Start: 2019-12-10 | End: 2021-03-03

## 2020-02-10 NOTE — PROGRESS NOTES
Junaid Ferrara is a 30 y.o. female who complains of  Pelvic pain and would like to discuss birth control options. Pt has a history of painful ovarian cyst with cycles- has used COCs in the past for control. Her current method of family planning is none. The patient is sexually active. TV ULTRASOUND  THE UTERUS IS ANTEVERTED, NORMAL IN SIZE AND ECHOGENICITY. THE ENDOMETRIUM MEASURES 16.8MM IN THICKNESS. NO MASSES OR ABNORMALITIES ARE SEEN. RIGHT OVARY APPEARS WNL. LEFT OVARY APPEARS TO HAVE A POSSIBLE COLLAPSING CYST MEASURING 16 X 18 MM. NO FREE FLUID IS SEEN IN THE CDS. Her relevant past medical history:   Past Medical History:   Diagnosis Date    Anemia     Asthma     Postpartum depression     depression and anxiety        Past Surgical History:   Procedure Laterality Date    HX OTHER SURGICAL  2011    10 ear surgeries, tubes, regraft ear drum    HX TYMPANOSTOMY       Social History     Occupational History    Not on file   Tobacco Use    Smoking status: Never Smoker    Smokeless tobacco: Never Used   Substance and Sexual Activity    Alcohol use: No    Drug use: No    Sexual activity: Yes     Partners: Male     Birth control/protection: None     Family History   Problem Relation Age of Onset    Other Sister         Heart condition- 'hole' in heart    Colon Cancer Maternal Grandmother         Diagnosed in 63's    No Known Problems Mother     No Known Problems Father        Allergies   Allergen Reactions    Ceftin [Cefuroxime Axetil] Hives     Prior to Admission medications    Medication Sig Start Date End Date Taking? Authorizing Provider   traZODone (DESYREL) 50 mg tablet TAKE 1 TABLET BY MOUTH DAILY AT BEDTIME 12/10/19  Yes Provider, Historical   sertraline (ZOLOFT) 50 mg tablet Take 2 Tabs by mouth daily. 4/29/19  Yes Maddy Lyle CNM   norethindrone [facility]) 0.35 mg tab Take 1 Tab by mouth daily.  8/2/19   Maddy Lyle CNM   dicloxacillin (DYNAPEN) 250 mg capsule Take 2 Caps by mouth four (4) times daily. 1/19/19   Dayna Montemayor CNM   ibuprofen (MOTRIN) 800 mg tablet Take 1 Tab by mouth every eight (8) hours. 11/7/18   Dayna Montemayor CNM   prenatal vits62/FA/om3/dha/epa (PRENATAL GUMMY PO) Take  by mouth.     Provider, Historical        Review of Systems - History obtained from the patient  Constitutional: negative for weight loss, fever, night sweats  HEENT: negative for hearing loss, earache, congestion, snoring, sorethroat  CV: negative for chest pain, palpitations, edema  Resp: negative for cough, shortness of breath, wheezing  Breast: negative for breast lumps, nipple discharge, galactorrhea  GI: negative for change in bowel habits, abdominal pain, black or bloody stools  : negative for frequency, dysuria, hematuria  MSK: negative for back pain, joint pain, muscle pain  Skin: negative for itching, rash, hives  Neuro: negative for dizziness, headache, confusion, weakness  Psych: negative for anxiety, depression, change in mood  Heme/lymph: negative for bleeding, bruising, pallor      Objective:  Visit Vitals  /70 (BP 1 Location: Left arm, BP Patient Position: Sitting)   Ht 5' 4\" (1.626 m)   Wt 171 lb (77.6 kg)   BMI 29.35 kg/m²          PHYSICAL EXAMINATION    Constitutional  · Appearance: well-nourished, well developed, alert, in no acute distress    HENT  · Head and Face: appears normal      Skin  · General Inspection: no rash, no lesions identified    Neurologic/Psychiatric  · Mental Status:  · Orientation: grossly oriented to person, place and time  · Mood and Affect: mood normal, affect appropriate    Assessment:    ovarian cyst    Plan:   sprintec rx    Erasmo Zhu CNM

## 2020-04-21 ENCOUNTER — VIRTUAL VISIT (OUTPATIENT)
Dept: OBGYN CLINIC | Age: 25
End: 2020-04-21

## 2020-04-21 VITALS — BODY MASS INDEX: 29.7 KG/M2 | WEIGHT: 173 LBS

## 2020-04-21 DIAGNOSIS — Z30.41 ENCOUNTER FOR SURVEILLANCE OF CONTRACEPTIVE PILLS: Primary | ICD-10-CM

## 2020-04-21 DIAGNOSIS — N92.6 IRREGULAR MENSTRUAL BLEEDING: ICD-10-CM

## 2020-04-21 RX ORDER — ESCITALOPRAM OXALATE 20 MG/1
20 TABLET ORAL DAILY
COMMUNITY
End: 2021-03-03

## 2020-04-21 RX ORDER — NORGESTIMATE AND ETHINYL ESTRADIOL 7DAYSX3 28
1 KIT ORAL DAILY
Qty: 90 TAB | Refills: 4 | Status: SHIPPED | OUTPATIENT
Start: 2020-04-21 | End: 2021-03-03

## 2020-04-21 NOTE — PROGRESS NOTES
[facility] Virtual Video visit    Tere Swift is a 30 y.o. female who was seen by synchronous (real-time) audio-video technology on 4/21/2020. Tere Swift is a 30 y.o. female who complains of break through Bleeding with sprintec and progesterone only pills prior to that. This is annoying more than anything, she declines wanting an IUD or depo at this time as pt would like to conceive at the end of the year. Her current method of family planning is on oral sprintec contraceptive. The patient is sexually active. She developed this problem approximately 3 weeks ago. Objective:     General: alert, cooperative, no distress   Mental  status: mental status: alert, oriented to person, place, and time, normal mood, behavior, speech, dress, motor activity, and thought processes   Resp: resp: normal effort and no respiratory distress   Neuro: neuro: no gross deficits   Skin: skin: no discoloration or lesions of concern on visible areas   Due to this being a TeleHealth evaluation, many elements of the physical examination are unable to be assessed. We discussed the expected course, resolution and complications of the diagnosis(es) in detail. Medication risks, benefits, costs, interactions, and alternatives were discussed as indicated. I advised her to contact the office if her condition worsens, changes or fails to improve as anticipated. She expressed understanding with the diagnosis(es) and plan. Pursuant to the emergency declaration under the [facility], [facility] waiver authority and the [employer] and [employer] General Act, this Virtual  Visit was conducted, with patient's consent, to reduce the patient's risk of exposure to COVID-19 and provide continuity of care for an established patient. Services were provided through a video synchronous discussion virtually to substitute for in-person clinic visit.     A/P  31 yo    irreg menstrual cycle on OCP    Will switch to tri sprintec to see if triphasic pills are more in line with what her body will adapt to than monophasic. Pt in agreement to try. Follow up in 2-3 months for AE when covid restrictions lift and pt is comfortable coming in  To office.    Fei Smith, ALEXANDRM

## 2020-06-08 ENCOUNTER — OFFICE VISIT (OUTPATIENT)
Dept: OBGYN CLINIC | Age: 25
End: 2020-06-08

## 2020-06-08 VITALS
BODY MASS INDEX: 31.5 KG/M2 | WEIGHT: 184.5 LBS | HEIGHT: 64 IN | SYSTOLIC BLOOD PRESSURE: 121 MMHG | DIASTOLIC BLOOD PRESSURE: 80 MMHG

## 2020-06-08 DIAGNOSIS — N92.6 IRREGULAR MENSES: Primary | ICD-10-CM

## 2020-06-08 NOTE — PROGRESS NOTES
Lopez Cantu is a 30 y.o. female who complains of  Irregular bleeding between cycles. Patient bleeding for 20 out 31 days in the month of May. US results    TV ULTRASOUND  THE UTERUS IS ANTEVERTED, NORMAL IN SIZE AND ECHOGENICITY. THE ENDOMETRIUM MEASURES 4MM IN THICKNESS. NO MASSES OR ABNORMALITIES ARE SEEN. BILATERAL OVARIES NOT SEEN DUE TO BOWEL GAS. BILATERAL ADNEXAS APPEAR WNL. NO FREE FLUID IS SEEN IN THE CDS. Her current method of family planning is oral progesterone-only contraceptive. The patient is sexually active. She developed this problem approximately 2 months ago. Her relevant past medical history:   Past Medical History:   Diagnosis Date    Anemia     Asthma     Postpartum depression     depression and anxiety        Past Surgical History:   Procedure Laterality Date    HX OTHER SURGICAL  2011    10 ear surgeries, tubes, regraft ear drum    HX TYMPANOSTOMY       Social History     Occupational History    Not on file   Tobacco Use    Smoking status: Never Smoker    Smokeless tobacco: Never Used   Substance and Sexual Activity    Alcohol use: No    Drug use: No    Sexual activity: Yes     Partners: Male     Birth control/protection: None     Family History   Problem Relation Age of Onset    Other Sister         Heart condition- 'hole' in heart    Colon Cancer Maternal Grandmother         Diagnosed in 63's    No Known Problems Mother     No Known Problems Father        Allergies   Allergen Reactions    Ceftin [Cefuroxime Axetil] Hives     Prior to Admission medications    Medication Sig Start Date End Date Taking? Authorizing Provider   norgestimate-ethinyl estradioL (Ortho Tri-Cyclen, 28,) 0.18/0.215/0.25 mg-35 mcg (28) tab Take 1 Tab by mouth daily. 4/21/20  Yes Serena Huntley CNM   escitalopram oxalate (Lexapro) 20 mg tablet Take 20 mg by mouth daily.     Provider, Historical   traZODone (DESYREL) 50 mg tablet TAKE 1 TABLET BY MOUTH DAILY AT BEDTIME 12/10/19   Provider, Susanne   dicloxacillin (DYNAPEN) 250 mg capsule Take 2 Caps by mouth four (4) times daily. 1/19/19   Fidel Garvin CNM   ibuprofen (MOTRIN) 800 mg tablet Take 1 Tab by mouth every eight (8) hours.  11/7/18   Fidel Garvin CNM        Review of Systems - History obtained from the patient  Constitutional: negative for weight loss, fever, night sweats  HEENT: negative for hearing loss, earache, congestion, snoring, sorethroat  CV: negative for chest pain, palpitations, edema  Resp: negative for cough, shortness of breath, wheezing  Breast: negative for breast lumps, nipple discharge, galactorrhea  GI: negative for change in bowel habits, abdominal pain, black or bloody stools  : negative for frequency, dysuria, hematuria  MSK: negative for back pain, joint pain, muscle pain  Skin: negative for itching, rash, hives  Neuro: negative for dizziness, headache, confusion, weakness  Psych: negative for anxiety, depression, change in mood  Heme/lymph: negative for bleeding, bruising, pallor      Objective:  Visit Vitals  /80   Ht 5' 4\" (1.626 m)   Wt 184 lb 8 oz (83.7 kg)   BMI 31.67 kg/m²          PHYSICAL EXAMINATION    Constitutional  · Appearance: well-nourished, well developed, alert, in no acute distress    HENT  · Head and Face: appears normal    Neck  · Inspection/Palpation: normal appearance, no masses or tenderness    Skin  · General Inspection: no rash, no lesions identified    Neurologic/Psychiatric  · Mental Status:  · Orientation: grossly oriented to person, place and time  · Mood and Affect: mood normal, affect appropriate    Assessment:   AUB- while on birth control   Irregular sloughing of thin endometrial tissue  Pt wishes to conceive in the next couple of months- recommend going off birth control to allow her body time to regulate- pt will consider   Not symptomatic for hypotension  Needs pap - bleeding moderately today - pt will wait as she wishes to conceive in the next few months. Declines STD testing today   Patient declines presence of chaperone during today's visit.         Sharmaine Laboy CNM

## 2020-06-08 NOTE — PATIENT INSTRUCTIONS
Abnormal Uterine Bleeding: Care Instructions  Your Care Instructions     Abnormal uterine bleeding is irregular bleeding from the uterus that is longer or heavier than usual or does not occur at your regular time. Sometimes it is caused by changes in hormone levels. It can also be caused by growths in the uterus, such as fibroids or polyps. Sometimes a cause cannot be found. You may have heavy bleeding when you are not expecting your period. Your doctor may suggest a pregnancy test, if you think you are pregnant. Follow-up care is a key part of your treatment and safety. Be sure to make and go to all appointments, and call your doctor if you are having problems. It's also a good idea to know your test results and keep a list of the medicines you take. How can you care for yourself at home? · Be safe with medicines. Take pain medicines exactly as directed. ? If the doctor gave you a prescription medicine for pain, take it as prescribed. ? If you are not taking a prescription pain medicine, ask your doctor if you can take an over-the-counter medicine. · You may be low in iron because of blood loss. Eat a balanced diet that is high in iron and vitamin C. Foods rich in iron include red meat, shellfish, eggs, beans, and leafy green vegetables. Talk to your doctor about whether you need to take iron pills or a multivitamin. When should you call for help? [id number] anytime you think you may need emergency care. For example, call if:  · You passed out (lost consciousness). Call your doctor now or seek immediate medical care if:  · You have new or worse belly or pelvic pain. · You have severe vaginal bleeding. · You feel dizzy or lightheaded, or you feel like you may faint. Watch closely for changes in your health, and be sure to contact your doctor if:  · You think you may be pregnant. · Your bleeding gets worse. · You do not get better as expected. Where can you learn more?   Go to http://fabiano-kris.info/  Enter [id number] in the search box to learn more about \"Abnormal Uterine Bleeding: Care Instructions. \"  Current as of: November 8, 2019               Content Version: 12.5  © 8488-3719 [employer]. Care instructions adapted under license by [facility] (which disclaims liability or warranty for this information). If you have questions about a medical condition or this instruction, always ask your healthcare professional. [address] any warranty or liability for your use of this information.

## 2020-07-27 DIAGNOSIS — O21.9 NAUSEA AND VOMITING DURING PREGNANCY: Primary | ICD-10-CM

## 2020-07-27 RX ORDER — ONDANSETRON 4 MG/1
4 TABLET, ORALLY DISINTEGRATING ORAL
Qty: 60 TAB | Refills: 0 | Status: SHIPPED | OUTPATIENT
Start: 2020-07-27 | End: 2021-03-03

## 2020-08-07 ENCOUNTER — OFFICE VISIT (OUTPATIENT)
Dept: OBGYN CLINIC | Age: 25
End: 2020-08-07
Payer: COMMERCIAL

## 2020-08-07 VITALS — WEIGHT: 188.25 LBS | BODY MASS INDEX: 32.14 KG/M2 | HEIGHT: 64 IN

## 2020-08-07 DIAGNOSIS — Z34.80 PRENATAL CARE OF MULTIGRAVIDA, ANTEPARTUM: Primary | ICD-10-CM

## 2020-08-07 DIAGNOSIS — Z3A.08 8 WEEKS GESTATION OF PREGNANCY: ICD-10-CM

## 2020-08-07 PROBLEM — Z34.90 PREGNANCY: Status: RESOLVED | Noted: 2018-11-07 | Resolved: 2020-08-07

## 2020-08-07 LAB
ANTIBODY SCREEN, EXTERNAL: NEGATIVE
HBSAG, EXTERNAL: NEGATIVE
HCT, EXTERNAL: 35.1
HGB, EXTERNAL: 11.9
HIV, EXTERNAL: NON REACTIVE
PLATELET CNT,   EXTERNAL: 254
RUBELLA, EXTERNAL: NORMAL
T. PALLIDUM, EXTERNAL: NON REACTIVE
TYPE, ABO & RH, EXTERNAL: NORMAL
URINALYSIS, EXTERNAL: NORMAL
VARICELLA, EXTERNAL: NORMAL

## 2020-08-07 PROCEDURE — 99214 OFFICE O/P EST MOD 30 MIN: CPT | Performed by: ADVANCED PRACTICE MIDWIFE

## 2020-08-07 NOTE — PROGRESS NOTES
Initial OB Visit    Ronny Holt is a 30 y.o.  presenting for initial OB visit. She is well without complaints today. is having some Nausea/vomiting. She denies pelvic pain and vaginal bleeding. Her past medical history is significant for n/a. This is her 4th pregnancy. Her past pregnancies have been uncomplicated. This pregnancy has been without complications. She denies a history of domestic violence. She has answered our office questionnaire. She and her partners substance history, exposure history, genetic history are neg. Ob/Gyn Hx:    Patient's last menstrual period was 2020.   History of STIs: Denies  SA: Yes, one partner    Health maintenance:  Pap-    OB History        4    Para   3    Term   3       0    AB   0    Living   3       SAB   0    TAB   0    Ectopic   0    Molar   0    Multiple   0    Live Births   3              Past Medical History:   Diagnosis Date    Anemia     Asthma     Postpartum depression     depression and anxiety     Past Surgical History:   Procedure Laterality Date    HX OTHER SURGICAL  2011    10 ear surgeries, tubes, regraft ear drum    HX TYMPANOSTOMY         Family History   Problem Relation Age of Onset    Other Sister         Heart condition- 'hole' in heart    Colon Cancer Maternal Grandmother         Diagnosed in     No Known Problems Mother     No Known Problems Father      Social History     Socioeconomic History    Marital status:      Spouse name: Chandler Youssef    Number of children: 2    Years of education: 14yrs    Highest education level: Some college, no degree   Occupational History    Not on file   Social Needs    Financial resource strain: Not very hard    Food insecurity     Worry: Never true     Inability: Never true    Transportation needs     Medical: No     Non-medical: No   Tobacco Use    Smoking status: Never Smoker    Smokeless tobacco: Never Used   Substance and Sexual Activity    Alcohol use: No    Drug use: No    Sexual activity: Yes     Partners: Male     Birth control/protection: None   Lifestyle    Physical activity     Days per week: 3 days     Minutes per session: 30 min    Stress: To some extent   Relationships    Social connections     Talks on phone: More than three times a week     Gets together: Twice a week     Attends [employer] service: More than 4 times per year     Active member of club or organization: No     Attends meetings of clubs or organizations: Never     Relationship status:     Intimate partner violence     Fear of current or ex partner: No     Emotionally abused: No     Physically abused: No     Forced sexual activity: No   Other Topics Concern     Service No    Blood Transfusions No    Caffeine Concern No    Occupational Exposure No    Hobby Hazards No    Sleep Concern No    Stress Concern No    Weight Concern No    Special Diet No    Back Care No    Exercise No    Bike Helmet No    Seat Belt No    Self-Exams No   Social History Narrative    Not on file       Current Outpatient Medications   Medication Sig Dispense Refill    PNV Comb #2-Iron-FA-Omega 3 29-1-400 mg cmpk Take  by mouth.  ondansetron (ZOFRAN ODT) 4 mg disintegrating tablet Take 1 Tab by mouth every eight (8) hours as needed for Nausea or Vomiting. 60 Tab 0    dicloxacillin (DYNAPEN) 250 mg capsule Take 2 Caps by mouth four (4) times daily. 40 Cap 0    ibuprofen (MOTRIN) 800 mg tablet Take 1 Tab by mouth every eight (8) hours. 90 Tab 3    escitalopram oxalate (Lexapro) 20 mg tablet Take 20 mg by mouth daily.  norgestimate-ethinyl estradioL (Ortho Tri-Cyclen, 28,) 0.18/0.215/0.25 mg-35 mcg (28) tab Take 1 Tab by mouth daily.  90 Tab 4    traZODone (DESYREL) 50 mg tablet TAKE 1 TABLET BY MOUTH DAILY AT BEDTIME       Allergies   Allergen Reactions    Ceftin [Cefuroxime Axetil] Hives       Review of Systems - History obtained from the patient  Constitutional: negative for weight loss, fever, night sweats  HEENT: negative for hearing loss, earache, congestion, snoring, sorethroat  CV: negative for chest pain, palpitations, edema  Resp: negative for cough, shortness of breath, wheezing  GI: negative for change in bowel habits, abdominal pain, black or bloody stools  : negative for frequency, dysuria, hematuria, vaginal discharge  MSK: negative for back pain, joint pain, muscle pain  Breast: negative for breast lumps, nipple discharge, galactorrhea  Skin :negative for itching, rash, hives  Neuro: negative for dizziness, headache, confusion, weakness  Psych: negative for anxiety, depression, change in mood  Heme/lymph: negative for bleeding, bruising, pallor    Physical Exam    Visit Vitals  Ht 5' 4\" (1.626 m)   Wt 188 lb 4 oz (85.4 kg)   LMP 2020   BMI 32.31 kg/m²       Constitutional  · Appearance: well-nourished, well developed, alert, in no acute distress    HENT  · Head and Face: appears normal    Skin  · General Inspection: no rash, no lesions identified    Neurologic/Psychiatric  · Mental Status:  · Orientation: grossly oriented to person, place and time  · Mood and Affect: mood normal, affect appropriate      Assessment/Plan:  30 y.o.  at 8w1d  lmp consistent with  US today. Confirmation of pregnancy     Reviewed EDC and dating ultrasound. IOB packet given and discussed including practice structure and collaborative care with MDs and Midwives. Reviewed call coverage including hospitalists. Discussed expected prenatal care and visits including IOB labs, anatomy scan, GTT, TDAP, Rhogam if indicated, third trimester labs and GBS. Dicussed emergency contact info. Discussed other high yield topics including appropriate exercise, diet, nutrition, and expected weight gain in pregnancy. Handouts given.  Discussed sex in pregnancy, avoiding cat litter, toxin/alcohol avoidance, traveling in pregnancy including zika travel warnings. She and partner have not recently and do not plan on traveling to [facility] areas during this pregnancy. We discussed COVID precautions and limiting office visits to limit her possible exposure at this time. She was encouraged to use mychart, virtual visits and phone calls until further notice, she voices understanding. New OB labs today. [email or web address] draw info and kit given today   Continue daily PNV. RTC: 1 month, or sooner prn for problems or concerns. Cramping, pain, bleeding precautions reviewed. Will contact with abnormal results. Handouts and instructions provided.     Clay Parmar CNM

## 2020-08-07 NOTE — PATIENT INSTRUCTIONS

## 2020-08-09 LAB — BACTERIA UR CULT: NORMAL

## 2020-08-11 LAB
ABO GROUP BLD: NORMAL
BASOPHILS # BLD AUTO: 0 X10E3/UL (ref 0–0.2)
BASOPHILS NFR BLD AUTO: 1 %
BLD GP AB SCN SERPL QL: NEGATIVE
EOSINOPHIL # BLD AUTO: 0 X10E3/UL (ref 0–0.4)
EOSINOPHIL NFR BLD AUTO: 0 %
ERYTHROCYTE [DISTWIDTH] IN BLOOD BY AUTOMATED COUNT: 13 % (ref 11.7–15.4)
HBV SURFACE AG SERPL QL IA: NEGATIVE
HCT VFR BLD AUTO: 35.1 % (ref 34–46.6)
HGB A MFR BLD: 97.6 % (ref 96.4–98.8)
HGB A2 MFR BLD COLUMN CHROM: 2.4 % (ref 1.8–3.2)
HGB BLD-MCNC: 11.9 G/DL (ref 11.1–15.9)
HGB C MFR BLD: 0 %
HGB F MFR BLD: 0 % (ref 0–2)
HGB FRACT BLD-IMP: NORMAL
HGB OTHER MFR BLD HPLC: 0 %
HGB S BLD QL SOLY: NEGATIVE
HGB S MFR BLD: 0 %
HIV 1+2 AB+HIV1 P24 AG SERPL QL IA: NON REACTIVE
IMM GRANULOCYTES # BLD AUTO: 0 X10E3/UL (ref 0–0.1)
IMM GRANULOCYTES NFR BLD AUTO: 0 %
LYMPHOCYTES # BLD AUTO: 0.9 X10E3/UL (ref 0.7–3.1)
LYMPHOCYTES NFR BLD AUTO: 15 %
MCH RBC QN AUTO: 29.7 PG (ref 26.6–33)
MCHC RBC AUTO-ENTMCNC: 33.9 G/DL (ref 31.5–35.7)
MCV RBC AUTO: 88 FL (ref 79–97)
MONOCYTES # BLD AUTO: 0.4 X10E3/UL (ref 0.1–0.9)
MONOCYTES NFR BLD AUTO: 6 %
NEUTROPHILS # BLD AUTO: 4.7 X10E3/UL (ref 1.4–7)
NEUTROPHILS NFR BLD AUTO: 78 %
PLATELET # BLD AUTO: 254 X10E3/UL (ref 150–450)
RBC # BLD AUTO: 4.01 X10E6/UL (ref 3.77–5.28)
RH BLD: POSITIVE
RUBV IGG SERPL IA-ACNC: 1.66 INDEX
TREPONEMA PALLIDUM IGG+IGM AB [PRESENCE] IN SERUM OR PLASMA BY IMMUNOASSAY: NON REACTIVE
VZV IGG SER IA-ACNC: 200 INDEX
WBC # BLD AUTO: 6 X10E3/UL (ref 3.4–10.8)

## 2020-08-12 LAB
C TRACH RRNA SPEC QL NAA+PROBE: NEGATIVE
N GONORRHOEA RRNA SPEC QL NAA+PROBE: NEGATIVE

## 2020-08-17 RX ORDER — DOXYLAMINE SUCCINATE AND PYRIDOXINE HYDROCHLORIDE 20; 20 MG/1; MG/1
1 TABLET, EXTENDED RELEASE ORAL 2 TIMES DAILY
Qty: 60 TAB | Refills: 3 | Status: SHIPPED | OUTPATIENT
Start: 2020-08-17 | End: 2021-03-03

## 2020-09-04 ENCOUNTER — ROUTINE PRENATAL (OUTPATIENT)
Dept: OBGYN CLINIC | Age: 25
End: 2020-09-04
Payer: COMMERCIAL

## 2020-09-04 VITALS
DIASTOLIC BLOOD PRESSURE: 88 MMHG | WEIGHT: 150.4 LBS | HEIGHT: 64 IN | SYSTOLIC BLOOD PRESSURE: 138 MMHG | BODY MASS INDEX: 25.68 KG/M2

## 2020-09-04 DIAGNOSIS — Z34.82 PRENATAL CARE, SUBSEQUENT PREGNANCY IN SECOND TRIMESTER: Primary | ICD-10-CM

## 2020-09-04 PROCEDURE — 0502F SUBSEQUENT PRENATAL CARE: CPT | Performed by: ADVANCED PRACTICE MIDWIFE

## 2020-09-04 NOTE — PROGRESS NOTES
Follow-up OB visit    Chief Complaint   Patient presents with    Routine Prenatal Visit       Patient Active Problem List    Diagnosis Date Noted    8 weeks gestation of pregnancy 2020        The patient reports the following concerns: none  Fetal heart rate:  160 Bpm    There were no vitals filed for this visit.   See PN flowsheet for exam    25 y.o.  12w1d   JULIA 4 weeks    []        Gaston Bynum CNM

## 2020-10-02 ENCOUNTER — ROUTINE PRENATAL (OUTPATIENT)
Dept: OBGYN CLINIC | Age: 25
End: 2020-10-02
Payer: COMMERCIAL

## 2020-10-02 VITALS — WEIGHT: 158 LBS | SYSTOLIC BLOOD PRESSURE: 130 MMHG | BODY MASS INDEX: 27.12 KG/M2 | DIASTOLIC BLOOD PRESSURE: 80 MMHG

## 2020-10-02 DIAGNOSIS — Z3A.08 8 WEEKS GESTATION OF PREGNANCY: ICD-10-CM

## 2020-10-02 PROCEDURE — 0502F SUBSEQUENT PRENATAL CARE: CPT | Performed by: ADVANCED PRACTICE MIDWIFE

## 2020-10-02 NOTE — PATIENT INSTRUCTIONS

## 2020-10-02 NOTE — PROGRESS NOTES
Patient reports nausea but overall doing well. Denies headaches and vb.   Feeling some better over all just tired    JULIA 4 weeks FAS

## 2020-10-28 ENCOUNTER — ROUTINE PRENATAL (OUTPATIENT)
Dept: OBGYN CLINIC | Age: 25
End: 2020-10-28
Payer: COMMERCIAL

## 2020-10-28 VITALS — SYSTOLIC BLOOD PRESSURE: 114 MMHG | DIASTOLIC BLOOD PRESSURE: 78 MMHG

## 2020-10-28 DIAGNOSIS — Z3A.19 19 WEEKS GESTATION OF PREGNANCY: ICD-10-CM

## 2020-10-28 PROCEDURE — 76805 OB US >/= 14 WKS SNGL FETUS: CPT | Performed by: ADVANCED PRACTICE MIDWIFE

## 2020-10-28 PROCEDURE — 0502F SUBSEQUENT PRENATAL CARE: CPT | Performed by: ADVANCED PRACTICE MIDWIFE

## 2020-10-28 NOTE — PROGRESS NOTES
FETAL SURVEY  A SINGLE VIABLE IUP AT 19W6D IS SEEN. FETAL CARDIAC MOTION OBSERVED. FETAL ANATOMY WAS WELL VISUALIZED AND APPEARS WNL. NO ABNORMALITIES WERE SEEN ON TODAYS EXAM.  APPROPRIATE GROWTH MEASURED. SIZE = DATES. MARIA E, PLACENTA AND CERVIX APPEAR WITHIN NORMAL LIMITS. GENDER: MALE    Patient presents with her  for her routine prenatal visit. She is without complaints or concerns, denies headaches and vb. Feeling some FM. N/V resolved, doing well emotionally. Will see in 4 weeks for JULIA.

## 2020-10-28 NOTE — PATIENT INSTRUCTIONS

## 2020-11-27 ENCOUNTER — ROUTINE PRENATAL (OUTPATIENT)
Dept: OBGYN CLINIC | Age: 25
End: 2020-11-27
Payer: COMMERCIAL

## 2020-11-27 VITALS
BODY MASS INDEX: 28 KG/M2 | SYSTOLIC BLOOD PRESSURE: 114 MMHG | WEIGHT: 164 LBS | HEIGHT: 64 IN | DIASTOLIC BLOOD PRESSURE: 66 MMHG

## 2020-11-27 DIAGNOSIS — Z34.82 PRENATAL CARE, SUBSEQUENT PREGNANCY IN SECOND TRIMESTER: Primary | ICD-10-CM

## 2020-11-27 PROCEDURE — 0502F SUBSEQUENT PRENATAL CARE: CPT | Performed by: ADVANCED PRACTICE MIDWIFE

## 2020-11-27 NOTE — PATIENT INSTRUCTIONS

## 2020-12-18 ENCOUNTER — ROUTINE PRENATAL (OUTPATIENT)
Dept: OBGYN CLINIC | Age: 25
End: 2020-12-18
Payer: COMMERCIAL

## 2020-12-18 VITALS
DIASTOLIC BLOOD PRESSURE: 72 MMHG | HEIGHT: 64 IN | SYSTOLIC BLOOD PRESSURE: 118 MMHG | BODY MASS INDEX: 33.12 KG/M2 | WEIGHT: 194 LBS

## 2020-12-18 DIAGNOSIS — Z34.80 SUPERVISION OF OTHER NORMAL PREGNANCY: Primary | ICD-10-CM

## 2020-12-18 LAB
BLOOD BANK CMNT PATIENT-IMP: NORMAL
BLOOD GROUP ANTIBODIES SERPL: NORMAL

## 2020-12-18 PROCEDURE — 0502F SUBSEQUENT PRENATAL CARE: CPT | Performed by: ADVANCED PRACTICE MIDWIFE

## 2020-12-18 NOTE — PROGRESS NOTES
PT is doing great. No concerns mentioned. Declined dtap and flu shot.    Stress due to living situation - needs to find affordable housing - currently living with her friend and this is not a good situation  JULIA 2 weeks GTT

## 2020-12-18 NOTE — PATIENT INSTRUCTIONS

## 2020-12-20 LAB
BASOPHILS # BLD: 0 K/UL (ref 0–0.1)
BASOPHILS NFR BLD: 1 % (ref 0–1)
DIFFERENTIAL METHOD BLD: ABNORMAL
EOSINOPHIL # BLD: 0 K/UL (ref 0–0.4)
EOSINOPHIL NFR BLD: 0 % (ref 0–7)
ERYTHROCYTE [DISTWIDTH] IN BLOOD BY AUTOMATED COUNT: 13.9 % (ref 11.5–14.5)
GLUCOSE 1H P 100 G GLC PO SERPL-MCNC: 110 MG/DL (ref 65–140)
HCT VFR BLD AUTO: 31.4 % (ref 35–47)
HGB BLD-MCNC: 9.7 G/DL (ref 11.5–16)
HIV 1+2 AB+HIV1 P24 AG SERPL QL IA: NONREACTIVE
HIV12 RESULT COMMENT, HHIVC: NORMAL
IMM GRANULOCYTES # BLD AUTO: 0 K/UL (ref 0–0.04)
IMM GRANULOCYTES NFR BLD AUTO: 0 % (ref 0–0.5)
LYMPHOCYTES # BLD: 1 K/UL (ref 0.8–3.5)
LYMPHOCYTES NFR BLD: 16 % (ref 12–49)
MCH RBC QN AUTO: 27 PG (ref 26–34)
MCHC RBC AUTO-ENTMCNC: 30.9 G/DL (ref 30–36.5)
MCV RBC AUTO: 87.5 FL (ref 80–99)
MONOCYTES # BLD: 0.3 K/UL (ref 0–1)
MONOCYTES NFR BLD: 5 % (ref 5–13)
NEUTS SEG # BLD: 4.6 K/UL (ref 1.8–8)
NEUTS SEG NFR BLD: 78 % (ref 32–75)
NRBC # BLD: 0 K/UL (ref 0–0.01)
NRBC BLD-RTO: 0 PER 100 WBC
PLATELET # BLD AUTO: 249 K/UL (ref 150–400)
PMV BLD AUTO: 11.5 FL (ref 8.9–12.9)
RBC # BLD AUTO: 3.59 M/UL (ref 3.8–5.2)
T PALLIDUM AB SER QL IA: NON REACTIVE
WBC # BLD AUTO: 6 K/UL (ref 3.6–11)

## 2020-12-31 ENCOUNTER — ROUTINE PRENATAL (OUTPATIENT)
Dept: OBGYN CLINIC | Age: 25
End: 2020-12-31
Payer: COMMERCIAL

## 2020-12-31 ENCOUNTER — OFFICE VISIT (OUTPATIENT)
Dept: OBGYN CLINIC | Age: 25
End: 2020-12-31

## 2020-12-31 VITALS — SYSTOLIC BLOOD PRESSURE: 118 MMHG | DIASTOLIC BLOOD PRESSURE: 68 MMHG | BODY MASS INDEX: 33.13 KG/M2 | WEIGHT: 193 LBS

## 2020-12-31 DIAGNOSIS — Z34.83 PRENATAL CARE, SUBSEQUENT PREGNANCY IN THIRD TRIMESTER: Primary | ICD-10-CM

## 2020-12-31 DIAGNOSIS — Z3A.29 29 WEEKS GESTATION OF PREGNANCY: ICD-10-CM

## 2020-12-31 PROCEDURE — 0502F SUBSEQUENT PRENATAL CARE: CPT | Performed by: ADVANCED PRACTICE MIDWIFE

## 2020-12-31 NOTE — PATIENT INSTRUCTIONS

## 2020-12-31 NOTE — PROGRESS NOTES
Pt is doing great. Saw US today. No concerns mentioned. Has not started Fe yet, discussed BID alternate times than [place] and with Vit C  GFM  Repeat CBC @ 37 wks    A SINGLE VERTEX 29W0D IUP IS SEEN. FETAL CARDIAC MOTION OBSERVED. LIMITED ANATOMY WAS VISUALIZED AND APPEARS WNL. APPROPRIATE GROWTH MEASURED. SIZE = DATES. 61st%  MARIA E AND PLACENTA APPEAR WITHIN NORMAL LIMITS.

## 2020-12-31 NOTE — PATIENT INSTRUCTIONS

## 2021-01-19 ENCOUNTER — ROUTINE PRENATAL (OUTPATIENT)
Dept: OBGYN CLINIC | Age: 26
End: 2021-01-19
Payer: COMMERCIAL

## 2021-01-19 VITALS — BODY MASS INDEX: 33.3 KG/M2 | SYSTOLIC BLOOD PRESSURE: 116 MMHG | DIASTOLIC BLOOD PRESSURE: 68 MMHG | WEIGHT: 194 LBS

## 2021-01-19 DIAGNOSIS — Z34.83 PRENATAL CARE, SUBSEQUENT PREGNANCY IN THIRD TRIMESTER: Primary | ICD-10-CM

## 2021-01-19 PROCEDURE — 0502F SUBSEQUENT PRENATAL CARE: CPT | Performed by: ADVANCED PRACTICE MIDWIFE

## 2021-01-19 NOTE — PROGRESS NOTES
Pt is feeling ok. Lots of lots she said.  Lots of pressure , pain and  Movement - sounds very expected for 4th baby and busy life- pt is under increased stressed due to current living situation - pt trying to move by end of February and then will need to switch schools with children-     JULIA 2 weeks  JULIA with growth scan 4-5 weeks

## 2021-01-19 NOTE — PATIENT INSTRUCTIONS

## 2021-02-08 ENCOUNTER — ROUTINE PRENATAL (OUTPATIENT)
Dept: OBGYN CLINIC | Age: 26
End: 2021-02-08
Payer: COMMERCIAL

## 2021-02-08 VITALS
SYSTOLIC BLOOD PRESSURE: 118 MMHG | BODY MASS INDEX: 34.03 KG/M2 | WEIGHT: 198.25 LBS | DIASTOLIC BLOOD PRESSURE: 76 MMHG

## 2021-02-08 DIAGNOSIS — Z34.83 PRENATAL CARE, SUBSEQUENT PREGNANCY IN THIRD TRIMESTER: Primary | ICD-10-CM

## 2021-02-08 PROCEDURE — 0502F SUBSEQUENT PRENATAL CARE: CPT | Performed by: ADVANCED PRACTICE MIDWIFE

## 2021-02-08 NOTE — PROGRESS NOTES
Pt is doing well. No concerns mentioned.    FH evening out - however pt states baby has dropped into her pelvis - growth scan 2/19/2021

## 2021-02-08 NOTE — PATIENT INSTRUCTIONS

## 2021-02-19 ENCOUNTER — ROUTINE PRENATAL (OUTPATIENT)
Dept: OBGYN CLINIC | Age: 26
End: 2021-02-19
Payer: COMMERCIAL

## 2021-02-19 VITALS — DIASTOLIC BLOOD PRESSURE: 68 MMHG | BODY MASS INDEX: 33.81 KG/M2 | SYSTOLIC BLOOD PRESSURE: 117 MMHG | WEIGHT: 197 LBS

## 2021-02-19 DIAGNOSIS — D64.9 ANEMIA, UNSPECIFIED TYPE: Primary | ICD-10-CM

## 2021-02-19 DIAGNOSIS — Z3A.08 8 WEEKS GESTATION OF PREGNANCY: ICD-10-CM

## 2021-02-19 LAB
ERYTHROCYTE [DISTWIDTH] IN BLOOD BY AUTOMATED COUNT: 15.9 % (ref 11.5–14.5)
GRBS, EXTERNAL: NEGATIVE
HCT VFR BLD AUTO: 31 % (ref 35–47)
HGB BLD-MCNC: 9.5 G/DL (ref 11.5–16)
MCH RBC QN AUTO: 25.3 PG (ref 26–34)
MCHC RBC AUTO-ENTMCNC: 30.6 G/DL (ref 30–36.5)
MCV RBC AUTO: 82.4 FL (ref 80–99)
NRBC # BLD: 0 K/UL (ref 0–0.01)
NRBC BLD-RTO: 0 PER 100 WBC
PLATELET # BLD AUTO: 219 K/UL (ref 150–400)
PMV BLD AUTO: 12.1 FL (ref 8.9–12.9)
RBC # BLD AUTO: 3.76 M/UL (ref 3.8–5.2)
WBC # BLD AUTO: 7.5 K/UL (ref 3.6–11)

## 2021-02-19 PROCEDURE — 0502F SUBSEQUENT PRENATAL CARE: CPT | Performed by: ADVANCED PRACTICE MIDWIFE

## 2021-02-19 NOTE — PROGRESS NOTES
Pt is doing well no concerns mentioned. Discussed COVID testing and gave information  JULIA 1 week  GBS today     LIMITED OB SCAN  A SINGLE VERTEX 36W1D IUP IS SEEN. FETAL CARDIAC MOTION OBSERVED. LIMITED ANATOMY WAS VISUALIZED AND APPEARS WNL. APPROPRIATE GROWTH MEASURED. SIZE = DATES. MARIA E AND PLACENTA APPEAR WITHIN NORMAL LIMITS.

## 2021-02-19 NOTE — PATIENT INSTRUCTIONS

## 2021-02-22 LAB
BACTERIA SPEC CULT: NORMAL
SERVICE CMNT-IMP: NORMAL

## 2021-02-24 ENCOUNTER — ROUTINE PRENATAL (OUTPATIENT)
Dept: OBGYN CLINIC | Age: 26
End: 2021-02-24
Payer: COMMERCIAL

## 2021-02-24 ENCOUNTER — HOSPITAL ENCOUNTER (OUTPATIENT)
Dept: PREADMISSION TESTING | Age: 26
Discharge: HOME | End: 2021-02-24
Payer: COMMERCIAL

## 2021-02-24 ENCOUNTER — TRANSCRIBE ORDER (OUTPATIENT)
Dept: REGISTRATION | Age: 26
End: 2021-02-24

## 2021-02-24 VITALS — SYSTOLIC BLOOD PRESSURE: 110 MMHG | BODY MASS INDEX: 33.92 KG/M2 | DIASTOLIC BLOOD PRESSURE: 80 MMHG | WEIGHT: 197.6 LBS

## 2021-02-24 DIAGNOSIS — Z01.812 PRE-PROCEDURE LAB EXAM: ICD-10-CM

## 2021-02-24 DIAGNOSIS — O99.013 ANEMIA DURING PREGNANCY IN THIRD TRIMESTER: ICD-10-CM

## 2021-02-24 DIAGNOSIS — Z3A.36 36 WEEKS GESTATION OF PREGNANCY: ICD-10-CM

## 2021-02-24 DIAGNOSIS — Z01.812 PRE-PROCEDURE LAB EXAM: Primary | ICD-10-CM

## 2021-02-24 DIAGNOSIS — Z34.83 PRENATAL CARE, SUBSEQUENT PREGNANCY IN THIRD TRIMESTER: Primary | ICD-10-CM

## 2021-02-24 PROCEDURE — U0005 INFEC AGEN DETEC AMPLI PROBE: HCPCS

## 2021-02-24 PROCEDURE — 0502F SUBSEQUENT PRENATAL CARE: CPT | Performed by: ADVANCED PRACTICE MIDWIFE

## 2021-02-24 NOTE — PATIENT INSTRUCTIONS

## 2021-02-24 NOTE — PROGRESS NOTES
occ nausea  GFM  Tired  Increase in Pedro-Garcia  Taking Fe consistently, despite Constipation  GBS neg, Covid testing today  Disc IOL Monday with Zack

## 2021-02-25 LAB — SARS-COV-2, COV2NT: NOT DETECTED

## 2021-03-01 ENCOUNTER — HOSPITAL ENCOUNTER (INPATIENT)
Age: 26
LOS: 2 days | Discharge: HOME | End: 2021-03-03
Attending: OBSTETRICS & GYNECOLOGY | Admitting: OBSTETRICS & GYNECOLOGY
Payer: COMMERCIAL

## 2021-03-01 ENCOUNTER — ROUTINE PRENATAL (OUTPATIENT)
Dept: OBGYN CLINIC | Age: 26
End: 2021-03-01
Payer: COMMERCIAL

## 2021-03-01 VITALS — BODY MASS INDEX: 34.23 KG/M2 | DIASTOLIC BLOOD PRESSURE: 76 MMHG | SYSTOLIC BLOOD PRESSURE: 114 MMHG | WEIGHT: 199.4 LBS

## 2021-03-01 DIAGNOSIS — Z36.9 UNSPECIFIED ANTENATAL SCREENING: Primary | ICD-10-CM

## 2021-03-01 DIAGNOSIS — Z3A.08 8 WEEKS GESTATION OF PREGNANCY: ICD-10-CM

## 2021-03-01 LAB
ERYTHROCYTE [DISTWIDTH] IN BLOOD BY AUTOMATED COUNT: 16.1 % (ref 11.5–14.5)
ERYTHROCYTE [DISTWIDTH] IN BLOOD BY AUTOMATED COUNT: 16.2 % (ref 11.5–14.5)
HCT VFR BLD AUTO: 28.8 % (ref 35–47)
HCT VFR BLD AUTO: 29.6 % (ref 35–47)
HGB BLD-MCNC: 9 G/DL (ref 11.5–16)
HGB BLD-MCNC: 9.1 G/DL (ref 11.5–16)
MCH RBC QN AUTO: 25.3 PG (ref 26–34)
MCH RBC QN AUTO: 25.4 PG (ref 26–34)
MCHC RBC AUTO-ENTMCNC: 30.7 G/DL (ref 30–36.5)
MCHC RBC AUTO-ENTMCNC: 31.3 G/DL (ref 30–36.5)
MCV RBC AUTO: 81.4 FL (ref 80–99)
MCV RBC AUTO: 82.2 FL (ref 80–99)
NRBC # BLD: 0 K/UL (ref 0–0.01)
NRBC # BLD: 0 K/UL (ref 0–0.01)
NRBC BLD-RTO: 0 PER 100 WBC
NRBC BLD-RTO: 0 PER 100 WBC
PLATELET # BLD AUTO: 187 K/UL (ref 150–400)
PLATELET # BLD AUTO: 192 K/UL (ref 150–400)
PMV BLD AUTO: 11.8 FL (ref 8.9–12.9)
PMV BLD AUTO: 11.9 FL (ref 8.9–12.9)
RBC # BLD AUTO: 3.54 M/UL (ref 3.8–5.2)
RBC # BLD AUTO: 3.6 M/UL (ref 3.8–5.2)
WBC # BLD AUTO: 6.9 K/UL (ref 3.6–11)
WBC # BLD AUTO: 8.6 K/UL (ref 3.6–11)

## 2021-03-01 PROCEDURE — 4A1HXCZ MONITORING OF PRODUCTS OF CONCEPTION, CARDIAC RATE, EXTERNAL APPROACH: ICD-10-PCS | Performed by: OBSTETRICS & GYNECOLOGY

## 2021-03-01 PROCEDURE — A4300 CATH IMPL VASC ACCESS PORTAL: HCPCS

## 2021-03-01 PROCEDURE — 36415 COLL VENOUS BLD VENIPUNCTURE: CPT

## 2021-03-01 PROCEDURE — 65270000029 HC RM PRIVATE

## 2021-03-01 PROCEDURE — 85027 COMPLETE CBC AUTOMATED: CPT

## 2021-03-01 PROCEDURE — 0502F SUBSEQUENT PRENATAL CARE: CPT | Performed by: ADVANCED PRACTICE MIDWIFE

## 2021-03-01 PROCEDURE — 74011250636 HC RX REV CODE- 250/636: Performed by: ADVANCED PRACTICE MIDWIFE

## 2021-03-01 RX ORDER — SODIUM CHLORIDE 0.9 % (FLUSH) 0.9 %
5-40 SYRINGE (ML) INJECTION AS NEEDED
Status: DISCONTINUED | OUTPATIENT
Start: 2021-03-01 | End: 2021-03-02 | Stop reason: HOSPADM

## 2021-03-01 RX ORDER — EPHEDRINE SULFATE/0.9% NACL/PF 50 MG/5 ML
SYRINGE (ML) INTRAVENOUS
Status: DISCONTINUED
Start: 2021-03-01 | End: 2021-03-02 | Stop reason: WASHOUT

## 2021-03-01 RX ORDER — FENTANYL CITRATE 50 UG/ML
100 INJECTION, SOLUTION INTRAMUSCULAR; INTRAVENOUS
Status: DISCONTINUED | OUTPATIENT
Start: 2021-03-01 | End: 2021-03-02 | Stop reason: HOSPADM

## 2021-03-01 RX ORDER — OXYTOCIN/RINGER'S LACTATE 30/500 ML
0-20 PLASTIC BAG, INJECTION (ML) INTRAVENOUS
Status: DISCONTINUED | OUTPATIENT
Start: 2021-03-01 | End: 2021-03-02 | Stop reason: HOSPADM

## 2021-03-01 RX ORDER — SODIUM CHLORIDE 0.9 % (FLUSH) 0.9 %
5-40 SYRINGE (ML) INJECTION EVERY 8 HOURS
Status: DISCONTINUED | OUTPATIENT
Start: 2021-03-01 | End: 2021-03-02 | Stop reason: HOSPADM

## 2021-03-01 RX ORDER — BUTORPHANOL TARTRATE 1 MG/ML
2 INJECTION INTRAMUSCULAR; INTRAVENOUS
Status: DISCONTINUED | OUTPATIENT
Start: 2021-03-01 | End: 2021-03-02 | Stop reason: HOSPADM

## 2021-03-01 RX ORDER — SODIUM CHLORIDE, SODIUM LACTATE, POTASSIUM CHLORIDE, CALCIUM CHLORIDE 600; 310; 30; 20 MG/100ML; MG/100ML; MG/100ML; MG/100ML
125 INJECTION, SOLUTION INTRAVENOUS CONTINUOUS
Status: DISCONTINUED | OUTPATIENT
Start: 2021-03-01 | End: 2021-03-03 | Stop reason: HOSPADM

## 2021-03-01 RX ORDER — ONDANSETRON 2 MG/ML
4 INJECTION INTRAMUSCULAR; INTRAVENOUS
Status: DISCONTINUED | OUTPATIENT
Start: 2021-03-01 | End: 2021-03-03 | Stop reason: HOSPADM

## 2021-03-01 RX ORDER — TERBUTALINE SULFATE 1 MG/ML
0.25 INJECTION SUBCUTANEOUS AS NEEDED
Status: DISCONTINUED | OUTPATIENT
Start: 2021-03-01 | End: 2021-03-02 | Stop reason: HOSPADM

## 2021-03-01 RX ADMIN — OXYTOCIN 2 MILLI-UNITS/MIN: 10 INJECTION INTRAVENOUS at 20:59

## 2021-03-01 RX ADMIN — SODIUM CHLORIDE, POTASSIUM CHLORIDE, SODIUM LACTATE AND CALCIUM CHLORIDE 125 ML/HR: 600; 310; 30; 20 INJECTION, SOLUTION INTRAVENOUS at 20:15

## 2021-03-01 RX ADMIN — ONDANSETRON 4 MG: 2 INJECTION INTRAMUSCULAR; INTRAVENOUS at 22:52

## 2021-03-01 NOTE — PROGRESS NOTES
Doing well!   Feels like baby is dropping - lots of pressure in her pelvis - having contractions - has been \"wetter\" than normal - nothing running down leg - nitrazine negative    SVE- SOL, [facility]  Got her covid test last week - hx of delivering at 37-38 weeks  CBC today for chronic anemia -   JULIA 1 week

## 2021-03-01 NOTE — PATIENT INSTRUCTIONS

## 2021-03-01 NOTE — H&P
History & Physical    Name: Prasanna Toribio MRN: [id number]  SSN: [id number]    YOB: 1995  Age: 30 y.o. Sex: female        Subjective:     Estimated Date of Delivery: 3/18/21  OB History        4    Para   3    Term   3       0    AB   0    Living   3       SAB   0    TAB   0    Ectopic   0    Molar   0    Multiple   0    Live Births   3                MsTaiwo Louis is admitted with pregnancy at 37w4d for SROM clear fluid. Prenatal course was normal. Please see prenatal records for details. Patient Active Problem List    Diagnosis    8 weeks gestation of pregnancy     Provider: CNM (Met Figuero[facility])    EDC by [place]  negative  Second baby with [address] labs: 2020  Genetic Screening: low risk BOY! Anatomy: normal  GTT: 110  Flu:  TDAP:  Rhogam: ABpos  Third Tri Labs: 9.7/31.4, Plt 249k, repeat @ 37 wks  GBS: Negative  COVID:Negative    Pain mgmt. in labor: epidural   Feeding:  Circ: Boy  Social:  - [place]  Daugther- Eva  Son- Jonathan Kaba ([address] [place] [address])  Son- Dilma Ji (with [employer])       No specialty comments available.   Past Medical History:   Diagnosis Date    Anemia     Asthma     Postpartum depression     depression and anxiety     Past Surgical History:   Procedure Laterality Date    HX OTHER SURGICAL      10 ear surgeries, tubes, regraft ear drum    HX TYMPANOSTOMY       Social History     Occupational History    Not on file   Tobacco Use    Smoking status: Never Smoker    Smokeless tobacco: Never Used   Substance and Sexual Activity    Alcohol use: No    Drug use: No    Sexual activity: Yes     Partners: Male     Birth control/protection: None     Family History   Problem Relation Age of Onset    Other Sister         Heart condition- 'hole' in heart    Colon Cancer Maternal Grandmother         Diagnosed in 's    No Known Problems Mother     No Known Problems Father        Allergies   Allergen Reactions    Ceftin [Cefuroxime Axetil] Hives     Prior to Admission medications    Medication Sig Start Date End Date Taking? Authorizing Provider   ferrous sulfate (IRON PO) Take  by mouth. Provider, Historical   doxylamine-pyridoxine, vit B6, ([place]) 20-20 mg TbID Take 1 Tab by mouth two (2) times a day. 8/17/20   Jennifer Francis CNM   PNV Comb #2-Iron-FA-Omega 3 29-1-400 mg cmpk Take  by mouth. Provider, Historical   ondansetron (ZOFRAN ODT) 4 mg disintegrating tablet Take 1 Tab by mouth every eight (8) hours as needed for Nausea or Vomiting. 7/27/20   Nonda Beulah, NP   escitalopram oxalate (Lexapro) 20 mg tablet Take 20 mg by mouth daily. Provider, Historical   norgestimate-ethinyl estradioL (Ortho Tri-Cyclen, 28,) 0.18/0.215/0.25 mg-35 mcg (28) tab Take 1 Tab by mouth daily. 4/21/20   Jennifer Francis CNM   traZODone (DESYREL) 50 mg tablet TAKE 1 TABLET BY MOUTH DAILY AT BEDTIME 12/10/19   Provider, Historical   dicloxacillin (DYNAPEN) 250 mg capsule Take 2 Caps by mouth four (4) times daily. 1/19/19   Jennifer Francis CNM   ibuprofen (MOTRIN) 800 mg tablet Take 1 Tab by mouth every eight (8) hours. 11/7/18   Jennifer Francis CNM        Review of Systems: A comprehensive review of systems was negative except for that written in the HPI. Objective:     Vitals: There were no vitals filed for this visit. Physical Exam:  Patient without distress. Heart: Regular rate and rhythm  Lung: clear to auscultation throughout lung fields, no wheezes, no rales, no rhonchi and normal respiratory effort  Abdomen: soft, nontender  Fundus: soft and non tender  Perineum: blood absent, amniotic fluid present  Cervical Exam: 3/50/-2 in office today  Lower Extremities: minimal MALCOLM  Membranes:  Spontaneous Rupture of Membranes;  Amniotic Fluid: clear fluid  Fetal Heart Rate: Reactive    Prenatal Labs:   Lab Results   Component Value Date/Time    Rubella, External Immune 08/07/2020 03:04 PM    GrBStrep, External Negative 2021    HBsAg, External Negative 2020 03:04 PM    HIV, External Non Reactive  2020 03:04 PM    Gonorrhea, External Negative 2018    Chlamydia, External Negative 2018        Assessment/Plan:   29 yo  SIUP @ 37 weeks 4 days  SROM clear fluid  PROM   Reassuring fetal status  GBS neg  Covid Neg   Plan:   Admit   Labs   Pain management as desires   Signed By:  Rafael Carty CNM     2021

## 2021-03-02 ENCOUNTER — ANESTHESIA EVENT (OUTPATIENT)
Dept: LABOR AND DELIVERY | Age: 26
End: 2021-03-02
Payer: COMMERCIAL

## 2021-03-02 ENCOUNTER — ANESTHESIA (OUTPATIENT)
Dept: LABOR AND DELIVERY | Age: 26
End: 2021-03-02
Payer: COMMERCIAL

## 2021-03-02 PROBLEM — O42.90 AMNIOTIC FLUID LEAKING: Status: ACTIVE | Noted: 2021-03-02

## 2021-03-02 PROBLEM — Z3A.37 37 WEEKS GESTATION OF PREGNANCY: Status: ACTIVE | Noted: 2021-03-02

## 2021-03-02 PROCEDURE — 75410000000 HC DELIVERY VAGINAL/SINGLE

## 2021-03-02 PROCEDURE — 74011250637 HC RX REV CODE- 250/637: Performed by: ADVANCED PRACTICE MIDWIFE

## 2021-03-02 PROCEDURE — 00HU33Z INSERTION OF INFUSION DEVICE INTO SPINAL CANAL, PERCUTANEOUS APPROACH: ICD-10-PCS | Performed by: ANESTHESIOLOGY

## 2021-03-02 PROCEDURE — 75410000002 HC LABOR FEE PER 1 HR

## 2021-03-02 PROCEDURE — 65410000002 HC RM PRIVATE OB

## 2021-03-02 PROCEDURE — 74011000250 HC RX REV CODE- 250: Performed by: ANESTHESIOLOGY

## 2021-03-02 PROCEDURE — 59400 OBSTETRICAL CARE: CPT | Performed by: ADVANCED PRACTICE MIDWIFE

## 2021-03-02 PROCEDURE — 74011250636 HC RX REV CODE- 250/636: Performed by: ADVANCED PRACTICE MIDWIFE

## 2021-03-02 PROCEDURE — 77030019905 HC CATH URETH INTMIT MDII -A

## 2021-03-02 PROCEDURE — 75410000003 HC RECOV DEL/VAG/CSECN EA 0.5 HR

## 2021-03-02 PROCEDURE — 76060000078 HC EPIDURAL ANESTHESIA

## 2021-03-02 RX ORDER — HYDROCODONE BITARTRATE AND ACETAMINOPHEN 5; 325 MG/1; MG/1
1 TABLET ORAL
Status: DISCONTINUED | OUTPATIENT
Start: 2021-03-02 | End: 2021-03-03 | Stop reason: HOSPADM

## 2021-03-02 RX ORDER — OXYTOCIN/RINGER'S LACTATE 30/500 ML
87.3 PLASTIC BAG, INJECTION (ML) INTRAVENOUS AS NEEDED
Status: COMPLETED | OUTPATIENT
Start: 2021-03-02 | End: 2021-03-02

## 2021-03-02 RX ORDER — SODIUM CHLORIDE, SODIUM LACTATE, POTASSIUM CHLORIDE, CALCIUM CHLORIDE 600; 310; 30; 20 MG/100ML; MG/100ML; MG/100ML; MG/100ML
125 INJECTION, SOLUTION INTRAVENOUS CONTINUOUS
Status: DISCONTINUED | OUTPATIENT
Start: 2021-03-02 | End: 2021-03-03 | Stop reason: HOSPADM

## 2021-03-02 RX ORDER — FENTANYL CITRATE 50 UG/ML
100 INJECTION, SOLUTION INTRAMUSCULAR; INTRAVENOUS ONCE
Status: DISCONTINUED | OUTPATIENT
Start: 2021-03-02 | End: 2021-03-02 | Stop reason: HOSPADM

## 2021-03-02 RX ORDER — OXYTOCIN/RINGER'S LACTATE 30/500 ML
10 PLASTIC BAG, INJECTION (ML) INTRAVENOUS AS NEEDED
Status: COMPLETED | OUTPATIENT
Start: 2021-03-02 | End: 2021-03-02

## 2021-03-02 RX ORDER — HYDROCORTISONE ACETATE PRAMOXINE HCL 2.5; 1 G/100G; G/100G
CREAM TOPICAL AS NEEDED
Status: DISCONTINUED | OUTPATIENT
Start: 2021-03-02 | End: 2021-03-03 | Stop reason: HOSPADM

## 2021-03-02 RX ORDER — NALOXONE HYDROCHLORIDE 0.4 MG/ML
0.4 INJECTION, SOLUTION INTRAMUSCULAR; INTRAVENOUS; SUBCUTANEOUS AS NEEDED
Status: DISCONTINUED | OUTPATIENT
Start: 2021-03-02 | End: 2021-03-02 | Stop reason: HOSPADM

## 2021-03-02 RX ORDER — IBUPROFEN 400 MG/1
800 TABLET ORAL EVERY 8 HOURS
Status: DISCONTINUED | OUTPATIENT
Start: 2021-03-02 | End: 2021-03-03 | Stop reason: HOSPADM

## 2021-03-02 RX ORDER — OXYTOCIN/RINGER'S LACTATE 30/500 ML
10 PLASTIC BAG, INJECTION (ML) INTRAVENOUS AS NEEDED
Status: DISCONTINUED | OUTPATIENT
Start: 2021-03-02 | End: 2021-03-03 | Stop reason: HOSPADM

## 2021-03-02 RX ORDER — FENTANYL/BUPIVACAINE/NS/PF 2-1250MCG
1-16 PREFILLED PUMP RESERVOIR EPIDURAL CONTINUOUS
Status: DISCONTINUED | OUTPATIENT
Start: 2021-03-02 | End: 2021-03-03 | Stop reason: HOSPADM

## 2021-03-02 RX ORDER — NALOXONE HYDROCHLORIDE 0.4 MG/ML
0.4 INJECTION, SOLUTION INTRAMUSCULAR; INTRAVENOUS; SUBCUTANEOUS AS NEEDED
Status: DISCONTINUED | OUTPATIENT
Start: 2021-03-02 | End: 2021-03-03 | Stop reason: HOSPADM

## 2021-03-02 RX ORDER — EPHEDRINE SULFATE/0.9% NACL/PF 50 MG/5 ML
12.5 SYRINGE (ML) INTRAVENOUS
Status: DISCONTINUED | OUTPATIENT
Start: 2021-03-02 | End: 2021-03-02 | Stop reason: HOSPADM

## 2021-03-02 RX ORDER — BUPIVACAINE HYDROCHLORIDE 2.5 MG/ML
INJECTION, SOLUTION EPIDURAL; INFILTRATION; INTRACAUDAL AS NEEDED
Status: DISCONTINUED | OUTPATIENT
Start: 2021-03-02 | End: 2021-03-02 | Stop reason: HOSPADM

## 2021-03-02 RX ORDER — OXYTOCIN/RINGER'S LACTATE 30/500 ML
87.3 PLASTIC BAG, INJECTION (ML) INTRAVENOUS AS NEEDED
Status: DISCONTINUED | OUTPATIENT
Start: 2021-03-02 | End: 2021-03-03 | Stop reason: HOSPADM

## 2021-03-02 RX ORDER — ACETAMINOPHEN 325 MG/1
650 TABLET ORAL
Status: DISCONTINUED | OUTPATIENT
Start: 2021-03-02 | End: 2021-03-03 | Stop reason: HOSPADM

## 2021-03-02 RX ORDER — BUPIVACAINE HYDROCHLORIDE 2.5 MG/ML
15 INJECTION, SOLUTION EPIDURAL; INFILTRATION; INTRACAUDAL ONCE
Status: ACTIVE | OUTPATIENT
Start: 2021-03-02 | End: 2021-03-02

## 2021-03-02 RX ORDER — ZOLPIDEM TARTRATE 5 MG/1
5 TABLET ORAL
Status: DISCONTINUED | OUTPATIENT
Start: 2021-03-02 | End: 2021-03-03 | Stop reason: HOSPADM

## 2021-03-02 RX ADMIN — IBUPROFEN 800 MG: 400 TABLET, FILM COATED ORAL at 04:10

## 2021-03-02 RX ADMIN — ACETAMINOPHEN 650 MG: 325 TABLET ORAL at 14:55

## 2021-03-02 RX ADMIN — SODIUM CHLORIDE, POTASSIUM CHLORIDE, SODIUM LACTATE AND CALCIUM CHLORIDE 999 ML/HR: 600; 310; 30; 20 INJECTION, SOLUTION INTRAVENOUS at 03:50

## 2021-03-02 RX ADMIN — IBUPROFEN 800 MG: 400 TABLET, FILM COATED ORAL at 20:59

## 2021-03-02 RX ADMIN — BUPIVACAINE HYDROCHLORIDE 2 ML: 2.5 INJECTION, SOLUTION EPIDURAL; INFILTRATION; INTRACAUDAL; PERINEURAL at 00:08

## 2021-03-02 RX ADMIN — BUPIVACAINE HYDROCHLORIDE 5 ML: 2.5 INJECTION, SOLUTION EPIDURAL; INFILTRATION; INTRACAUDAL; PERINEURAL at 00:17

## 2021-03-02 RX ADMIN — SODIUM CHLORIDE, POTASSIUM CHLORIDE, SODIUM LACTATE AND CALCIUM CHLORIDE 999 ML/HR: 600; 310; 30; 20 INJECTION, SOLUTION INTRAVENOUS at 00:15

## 2021-03-02 RX ADMIN — HYDROCODONE BITARTRATE AND ACETAMINOPHEN 1 TABLET: 5; 325 TABLET ORAL at 23:20

## 2021-03-02 RX ADMIN — HYDROCODONE BITARTRATE AND ACETAMINOPHEN 1 TABLET: 5; 325 TABLET ORAL at 19:02

## 2021-03-02 RX ADMIN — IBUPROFEN 800 MG: 400 TABLET, FILM COATED ORAL at 12:24

## 2021-03-02 RX ADMIN — OXYTOCIN 87.3 MILLI-UNITS/MIN: 10 INJECTION INTRAVENOUS at 03:20

## 2021-03-02 RX ADMIN — BUPIVACAINE HYDROCHLORIDE 5 ML: 2.5 INJECTION, SOLUTION EPIDURAL; INFILTRATION; INTRACAUDAL; PERINEURAL at 00:12

## 2021-03-02 RX ADMIN — SODIUM CHLORIDE, POTASSIUM CHLORIDE, SODIUM LACTATE AND CALCIUM CHLORIDE 125 ML/HR: 600; 310; 30; 20 INJECTION, SOLUTION INTRAVENOUS at 01:23

## 2021-03-02 RX ADMIN — OXYTOCIN: 10 INJECTION INTRAVENOUS at 03:09

## 2021-03-02 RX ADMIN — Medication 10 ML/HR: at 00:28

## 2021-03-02 NOTE — LACTATION NOTE
This note was copied from a baby's chart. Initial Lactation Consultation -  Baby born vaginally early this morning to a  mom at 44 3/7 weeks gestation. Mom states she did not breast feed her first 2 children but breast fed her last child for 10 months. She said this baby has latched and nursed well. I encouraged mom to watch the baby for feeding cues and feed when ever he is acting hungry.

## 2021-03-02 NOTE — PROGRESS NOTES
Labor Progress Note  Patient seen, fetal heart rate and contraction pattern evaluated, patient examined.   Visit Vitals  /69   Pulse 97   Temp 98.1 °F (36.7 °C)   Resp 16   Ht 5' 4\" (1.626 m)   Wt 199 lb 6.5 oz (90.5 kg)   LMP 06/11/2020   BMI 34.23 kg/m²       Physical Exam:  Cervical Exam:  Deferred   Membranes:  continuous to leak clear fluid  Uterine Activity: occasional mild  Fetal Heart Rate: Reactive    Assessment/Plan:  Reassuring fetal status   Reviewed option for expectant management versus augmentation    Pt jony Ward CNM

## 2021-03-02 NOTE — L&D DELIVERY NOTE
Delivery Summary   live male over IP. vtx delivered in HARRY position. Infant placed on maternal abdomen with spontaneous vigorous crying. 3 vessel cord clamped x 2 by CNM then cut by FOB after cessations of pulsations. Spontaneous delivery of intact placenta. EBL < 350 ml     Patient: Orville Orellana MRN: [id number]  SSN: [id number]    YOB: 1995  Age: 30 y.o.   Sex: female       Information for the patient's :  Clint Blanco [[id number]]       Labor Events:    Labor: No    Steroids:     Cervical Ripening Date/Time:       Cervical Ripening Type: None   Antibiotics During Labor: Yes   Rupture Identifier:      Rupture Date/Time:       Rupture Type: SROM   Amniotic Fluid Volume:      Amniotic Fluid Description: Clear    Amniotic Fluid Odor:      Induction:         Induction Date/Time:        Indications for Induction:      Augmentation: Oxytocin   Augmentation Date/Time: 3/1/36365:59 PM   Indications for Augmentation: Ineffective Contraction Pattern   Labor complications: None       Additional complications:        Delivery Events:  Indications For Episiotomy:     Episiotomy: None   Perineal Laceration(s): None   Repaired:     Periurethral Laceration Location:      Repaired:     Labial Laceration Location:     Repaired:     Sulcal Laceration Location:     Repaired:     Vaginal Laceration Location:     Repaired:     Cervical Laceration Location:     Repaired:     Repair Suture:     Number of Repair Packets:     Estimated Blood Loss (ml):  ml   Quantitative Blood Loss (ml)                Delivery Date: 3/2/2021    Delivery Time: 3:04 AM  Delivery Type: Vaginal, Spontaneous  Sex:  Male    Gestational Age: 37w6d   Delivery Clinician:  Melina Jeff  Living Status: Living   Delivery Location: L&D L&D 6          APGARS  One minute Five minutes Ten minutes   Skin color: 1   1        Heart rate: 2   2        Grimace: 2   2        Muscle tone: 1   2        Breathin   2 Totals: 7   9            Presentation: Vertex    Position:   Occiput    Resuscitation Method:  Tactile Stimulation     Meconium Stained: None      Cord Information: 3 Vessels  Complications: Nuchal Cord Without Compressions  Cord around: head  Delayed cord clamping? Cord clamped date/time:3/2/2021  3:08 AM  Disposition of Cord Blood: Discard    Blood Gases Sent?: No    Placenta:  Date/Time: 3/2/2021  3:08 AM  Removal: Spontaneous      Appearance: Normal     [place] Measurements:  Birth Weight:        Birth Length:        Head Circumference:        Chest Circumference:       Abdominal Girth: Other Providers:   Arsen JOHNSON;JULIETTE REYNAGA;Michael ROMERO, Primary Nurse;Primary [place] Nurse;Midwife           Group B Strep:   Lab Results   Component Value Date/Time    GrBStrep, External Negative 2021     Information for the patient's :  Doreatha Leventhal [[id number]]   No results found for: ABORH, PCTABR, PCTDIG, BILI, ABORHEXT, ABORH     No results for input(s): PCO2CB, PO2CB, HCO3I, SO2I, IBD, PTEMPI, SPECTI, PHICB, ISITE, IDEV, IALLEN in the last 72 hours.

## 2021-03-02 NOTE — PROGRESS NOTES
Bedside shift change report given to MCKENZIE Montes De Oca (oncoming nurse) by Sarwat Berrios RN (offgoing nurse). Report included the following information SBAR.

## 2021-03-02 NOTE — ANESTHESIA PREPROCEDURE EVALUATION
Relevant Problems   No relevant active problems       Anesthetic History   No history of anesthetic complications            Review of Systems / Medical History  Patient summary reviewed, nursing notes reviewed and pertinent labs reviewed    Pulmonary  Within defined limits          Asthma        Neuro/Psych   Within defined limits           Cardiovascular  Within defined limits                     GI/Hepatic/Renal  Within defined limits              Endo/Other  Within defined limits           Other Findings              Physical Exam    Airway  Mallampati: II  TM Distance: > 6 cm  Neck ROM: normal range of motion   Mouth opening: Normal     Cardiovascular  Regular rate and rhythm,  S1 and S2 normal,  no murmur, click, rub, or gallop             Dental  No notable dental hx       Pulmonary  Breath sounds clear to auscultation               Abdominal  GI exam deferred       Other Findings            Anesthetic Plan    ASA: 2  Anesthesia type: epidural            Anesthetic plan and risks discussed with: Patient

## 2021-03-02 NOTE — PROGRESS NOTES
TRANSFER - IN REPORT:    Verbal report received from [id number] RN(name) on [employer]  being received from L&D(unit) for routine progression of care      Report consisted of patients Situation, Background, Assessment and   Recommendations(SBAR). Information from the following report(s) SBAR and MAR was reviewed with the receiving nurse. Opportunity for questions and clarification was provided. Assessment completed upon patients arrival to unit and care assumed.

## 2021-03-02 NOTE — ANESTHESIA PROCEDURE NOTES
Epidural Block    Reason for block: labor epidural  Staffing  Performed: attending   Epidural  Guidance: landmark technique  Additional Notes  Labor Epidural catheter placement Procedure Note    Epidural analgesia requested. All questions answered regarding epidural placement. Patient wishes to proceed. \"Time-out\" performed  Patient was placed in the seated position. The back was prepped and draped at the lumbar region. Lidocaine 1% x 2ml was injected locally at ~L3 - L4. A 17G Tuohy needle was placed at the above level. Loss of resistance was obtained, no CSF or paresthesia was apparent. A 19 G epidural catheter was placed and secured at ~7 cm at the skin. Aspiration was negative. No paresthesias noted. A test dose of local anesthetic as charted  was negative for heart rate change, tinnitus, metallic taste or mental status changes. Catheter was secured with Tegaderm and tape. Constant infusion w/ patient controlled bolus as ordered was started by RN. No apparent complications. Vital signs were stable and the patient reported relief.

## 2021-03-02 NOTE — PROGRESS NOTES
1930: .Bedside and Verbal shift change report given to MARCELL Pablo RN (oncoming nurse) by Wiseryou. Daniel Bansal RN (offgoing nurse). Report included the following information SBAR and Kardex. : PIV and labs sent    : LYDIA Sanders at bedside discussing plan of care. CNM and pt deciding to start pitocin. Pt given opportunity to ask questions and pt verbalized understanding. 2330: Pt requesting epidural. Fluid bolus started. 2355: Pt using bedside commode to void. 0000: Anesthesia called to bedside for epidural placement. 0010: Dr. Lupe Du at bedside. Timeout taken. 0018: Pt laid down in left side lying position. 0030: Pt turned to right side side lying position. 0202: Straight cath. Urine drained 25mL    0205: SVE per this RN 7-8/90/0    0210: Pt turned to left side-lying position with peanut ball. 0245: SVE per this RN 10/100/+1    0252: LYDIA Sanders notified to come to bedside for delivery    [phone]: LYDIA Sanders at bedside    1746: Pt began pushing at this time. 0304:  infant male placed on maternal abdomen. Tactile stimulation performed. APGARs 7/9 assigned by [address]: Epidural removed at this time    0550: TRANSFER - OUT REPORT:    Verbal report given to Lawyer Nichole RN (name) on [employer]  being transferred to MIU (unit) for routine progression of care       Report consisted of patients Situation, Background, Assessment and   Recommendations(SBAR). Information from the following report(s) SBAR, Kardex, Procedure Summary, Intake/Output, MAR, Accordion and Recent Results was reviewed with the receiving nurse. Lines:   Peripheral IV 21 Left Wrist (Active)   Site Assessment Clean, dry, & intact 21   Phlebitis Assessment 0 21   Infiltration Assessment 0 21   Dressing Status Clean, dry, & intact 21   Dressing Type Tape;Transparent 21   Hub Color/Line Status Pink; Infusing 21     Opportunity for questions and clarification was provided.      Patient transported with:   Registered Nurse

## 2021-03-03 ENCOUNTER — TELEPHONE (OUTPATIENT)
Dept: OBGYN CLINIC | Age: 26
End: 2021-03-03

## 2021-03-03 VITALS
TEMPERATURE: 97.5 F | OXYGEN SATURATION: 99 % | HEART RATE: 64 BPM | HEIGHT: 64 IN | BODY MASS INDEX: 34.04 KG/M2 | WEIGHT: 199.41 LBS | DIASTOLIC BLOOD PRESSURE: 64 MMHG | SYSTOLIC BLOOD PRESSURE: 108 MMHG | RESPIRATION RATE: 16 BRPM

## 2021-03-03 DIAGNOSIS — Z64.1 MULTIPARITY: ICD-10-CM

## 2021-03-03 PROCEDURE — 74011250637 HC RX REV CODE- 250/637: Performed by: ADVANCED PRACTICE MIDWIFE

## 2021-03-03 RX ORDER — IBUPROFEN 800 MG/1
800 TABLET ORAL
Qty: 90 TAB | Refills: 1 | Status: SHIPPED | OUTPATIENT
Start: 2021-03-03 | End: 2021-03-03 | Stop reason: SDUPTHER

## 2021-03-03 RX ORDER — HYDROCODONE BITARTRATE AND ACETAMINOPHEN 5; 325 MG/1; MG/1
1 TABLET ORAL
Qty: 10 TAB | Refills: 0 | Status: SHIPPED | OUTPATIENT
Start: 2021-03-03 | End: 2021-03-03 | Stop reason: SDUPTHER

## 2021-03-03 RX ORDER — HYDROCODONE BITARTRATE AND ACETAMINOPHEN 5; 325 MG/1; MG/1
1 TABLET ORAL
Qty: 10 TAB | Refills: 0 | Status: SHIPPED | OUTPATIENT
Start: 2021-03-03 | End: 2021-03-06

## 2021-03-03 RX ORDER — IBUPROFEN 800 MG/1
800 TABLET ORAL
Qty: 90 TAB | Refills: 1 | Status: SHIPPED | OUTPATIENT
Start: 2021-03-03 | End: 2021-11-22

## 2021-03-03 RX ORDER — HYDROCODONE BITARTRATE AND ACETAMINOPHEN 5; 325 MG/1; MG/1
1 TABLET ORAL
Qty: 10 TAB | Refills: 0 | Status: SHIPPED | OUTPATIENT
Start: 2021-03-03 | End: 2021-03-03 | Stop reason: RX

## 2021-03-03 RX ORDER — IBUPROFEN 800 MG/1
800 TABLET ORAL EVERY 8 HOURS
Qty: 90 TAB | Refills: 1 | Status: SHIPPED | OUTPATIENT
Start: 2021-03-03 | End: 2021-11-22

## 2021-03-03 RX ADMIN — IBUPROFEN 800 MG: 400 TABLET, FILM COATED ORAL at 14:47

## 2021-03-03 RX ADMIN — IBUPROFEN 800 MG: 400 TABLET, FILM COATED ORAL at 04:55

## 2021-03-03 RX ADMIN — ACETAMINOPHEN 650 MG: 325 TABLET ORAL at 10:15

## 2021-03-03 NOTE — DISCHARGE SUMMARY
Obstetrical Discharge Summary     Name: Leander Gutierrez MRN: [id number]  SSN: [id number]    YOB: 1995  Age: 30 y.o. Sex: female      Allergies: Ceftin [cefuroxime axetil]    Admit Date: 3/1/2021    Discharge Date: 3/3/2021     Admitting Physician: Huber Rizvi    Attending Physician:  Yoandy Vargas*     * Admission Diagnoses: 37 weeks gestation of pregnancy [Z3A.37]  Amniotic fluid leaking [O42.90]    * Discharge Diagnoses:   Information for the patient's :  Burke Lockhart [[id number]]   Delivery of a 7 lb 3.9 oz (3.285 kg) male infant via Vaginal, Spontaneous on 3/2/2021 at 3:04 AM  by Lissette Kennedy. Apgars were 7  and 9 . Additional Diagnoses:   Hospital Problems as of 3/3/2021 Date Reviewed: 2021          Codes Class Noted - Resolved POA    Amniotic fluid leaking ICD-10-CM: O42.90  ICD-9-CM: 658.10  3/2/2021 - Present Unknown        37 weeks gestation of pregnancy ICD-10-CM: Z3A.37  ICD-9-CM: V22.2  3/2/2021 - Present Unknown             Lab Results   Component Value Date/Time    Rubella, External Immune 2020 03:04 PM    GrBStrep, External Negative 2021    ABO,Rh AB Positive 2020 03:04 PM    There is no immunization history for the selected administration types on file for this patient.     * Procedures:   * No surgery found *      Retired Use Flowsheet (6) [place]  Depression Scale  I have been able to laugh and see the funny side of things: As much as I always could  I have looked forward with enjoyment to things: As much as I ever did  I have blamed myself unnecessarily when things went wrong: No, never  I have been anxious or worried for no good reason: No, not at all  I have felt scared or panicky for no very good reason: No, not at all  Things have been getting on top of me: No, I have been coping as well as ever  I have been so unhappy that I have had difficulty sleeping: No, not at all  I have felt sad or miserable: No, not at all  I have been so unhappy that I have been crying: No, never  The thought of harming myself has occurred to me: Never  Total Score: 0    * Discharge Condition: good    * Hospital Course: Normal hospital course following the delivery. * Disposition: Home    Discharge Medications:   Current Discharge Medication List          * Follow-up Care/Patient Instructions:   Activity: Activity as tolerated  Diet: Regular Diet  Wound Care: None needed    Follow-up Information    None          Bhavik Boles CNM

## 2021-03-03 NOTE — PROGRESS NOTES
Postpartum Note    S/P , PPD#2  Ambulating and Voiding without diff  Alex po and po meds well  Breastfeeding well established  Desires discharge home    The risks and benefits of the circumcision  procedure and anesthesia including: bleeding, infection, variability of cosmetic results were discussed at length with the mother. She is aware that future repeat procedures may be necessary. She gives informed consent to proceed as noted and her questions are answered. Consent obtained and Lidocaine orders entered. O:  A,A&O x 3        VSS, Afebrile         Patient Vitals for the past 24 hrs:   Temp Pulse Resp BP   21 0141 97.5 °F (36.4 °C) 85 16 100/60   21 2110 97.5 °F (36.4 °C) 64 16 132/68   21 1626 98.1 °F (36.7 °C) 90 16 99/61   21 0937 97.8 °F (36.6 °C) 80 16 120/60          Breasts soft, NT       Abd soft, NT/ND       FF@ U-1, ML       Scant Lochia Rubra       Perineum Intact       Ext without REP, Neg Noel's    A/P: Routine PP care          Discharge home in stable cond.           RTO 6 wks for PP exam, sooner prn           Juel Jeans, CNM

## 2021-03-03 NOTE — DISCHARGE INSTRUCTIONS
POSTPARTUM DISCHARGE INSTRUCTIONS       Name:  Emilia Grove  YOB: 1995  Admission Diagnosis:  37 weeks gestation of pregnancy [Z3A.37]  Amniotic fluid leaking [O42.90]     Discharge Diagnosis:    Problem List as of 3/3/2021 Date Reviewed: 3/3/2021          Codes Class Noted - Resolved    Amniotic fluid leaking ICD-10-CM: O42.90  ICD-9-CM: 658.10  3/2/2021 - Present        37 weeks gestation of pregnancy ICD-10-CM: Z3A.37  ICD-9-CM: V22.2  3/2/2021 - Present        8 weeks gestation of pregnancy ICD-10-CM: Z3A.08  ICD-9-CM: V22.2  2020 - Present    Overview Addendum 3/1/2021  2:32 PM by Jennifer Ortega RN     Provider: NERY ([facility])    EDC by Klaus Hickman negative  Second baby with JULIA  Anemic    IOB labs: 2020  Genetic Screening: low risk BOY! Anatomy: normal  GTT: 110  Flu:  TDAP:  Rhogam: ABpos  Third Tri Labs: 9.7/31.4, Plt 249k, repeat @ 37 wks  GBS: Negative  COVID:Negative    Pain mgmt. in labor: epidural   Feeding:  Circ: Boy  Social:  - [address]  Son- Celina Stone ([address] [place] [address])  Son- Victorinatrice Cousin (with L-A MARCIAL)             RESOLVED: Pregnancy ICD-10-CM: Z34.90  ICD-9-CM: V22.2  2018 - 2020        RESOLVED: 10 weeks gestation of pregnancy ICD-10-CM: Z3A.10  ICD-9-CM: V22.2  2018 - 2018    Overview Addendum 2018  5:43 AM by Bib Dykes MD     Primary Provider: Adria    EDC by lmp  Pertinents:  HX LGA babies 9+ pounds  IOB labs: pap NIL 18  Genetic Screening: Panorama- male low risk  Anatomy: FETAL ANATOMY WAS WELL VISUALIZED AND APPEARS WNL. NO ABNORMALITIES WERE SEEN ON TODAYS EXAM.  APPROPRIATE GROWTH MEASURED. SIZE = DATES. MARIA E AND CERVIX APPEAR WITHIN NORMAL LIMITS. GENDER: MALE  CORD INSERTION INTO PLACENTA APPEARS MARGINAL.   THERE APPEARS TO BE HYPERECHOIC AREA WITHIN THE LEFT VENTRICLE OF THE FETAL HEART, POSSIBLE  LEFT ECHOGENIC FOCI; fu US every 4 weeks  Polyhydramnios; MARIA E 26 at 37weeks  Measuring 72% 10/1/18- hx large baby  GTT: 28 weeks 109  Flu: declined  9/19/18  TDAP: declined 9/19/18  Rhogam: n/a  Third Tri Labs: Hgb 10.3  GBS: Negative    Pain mgmt. in labor: epidural   Feeding: breast  Circ:  Social: Lives in [place]                   Attending Physician:  Grecia Chavez*    Delivery Type:  Vaginal Childbirth with Episiotomy, Laceration or Tear: What To Expect At [facility] body will slowly heal in the next few weeks. It is easy to get too tired and overwhelmed during the first weeks after your baby is born. Changes in your hormones can shift your mood without warning. You may find it hard to meet the extra demands on your energy and time. Take it easy on yourself. Follow-up care is a key part of your treatment and safety. Be sure to make and go to all appointments, and call your doctor if you are having problems. It's also a good idea to know your test results and keep a list of the medicines you take. How can you care for yourself at home? Vaginal Bleeding and Cramps  · After delivery, you will have a bloody discharge from the vagina. This will turn pink within a week and then white or yellow after about 10 days. It may last for 2 to 4 weeks or longer, until the uterus has healed. Use pads instead of tampons until you stop bleeding. · Do not worry if you pass some blood clots, as long as they are smaller than a golf ball. If you have a tear or stitches in your vaginal area, change the pad at least every 4 hours to prevent soreness and infection. · You may have cramps for the first few days after childbirth. These are normal and occur as the uterus shrinks to normal size. Take an over-the-counter pain medicine, such as acetaminophen (Tylenol), ibuprofen (Advil, Motrin), or naproxen (Aleve), for cramps. Read and follow all instructions on the label. Do not take aspirin, because it can cause more bleeding.   Do not take acetaminophen (Tylenol) and other acetaminophen containing medications (i.e. Percocet) at the same time. Episiotomy, Lacerations or Tears  · If you have stitches, they will dissolve on their own and do not need to be removed. · Put ice or a cold pack on your painful area for 10 to 20 minutes at a time, several times a day, for the first few days. Put a thin cloth between the ice and your skin. · Sit in a few inches of warm water (sitz bath) 3 times a day and after bowel movements. The warm water helps with pain and itching. If you do not have a tub, a warm shower might help. Breast fullness  · Your breasts may overfill (engorge) in the first few days after delivery. To help milk flow and to relieve pain, warm your breasts in the shower or by using warm, moist towels before nursing. · If you are not nursing, do not put warmth on your breasts or touch your breasts. Wear a tight bra or sports bra and use ice until the fullness goes away. This usually takes 2 to 3 days. · Put ice or a cold pack on your breast after nursing to reduce swelling and pain. Put a thin cloth between the ice and your skin. Activity  · Eat a balanced diet. Do not try to lose weight by cutting calories. Keep taking your prenatal vitamins, or take a multivitamin. · Get as much rest as you can. Try to take naps when your baby sleeps during the day. · Get some exercise every day. But do not do any heavy exercise until your doctor says it is okay. · Wait until you are healed (about 4 to 6 weeks) before you have sexual intercourse. Your doctor will tell you when it is okay to have sex. · Talk to your doctor about birth control. You can get pregnant even before your period returns. Also, you can get pregnant while you are breast-feeding. Mental Health  · Many women get the \"baby blues\" during the first few days after childbirth. You may lose sleep, feel irritable, and cry easily. You may feel happy one minute and sad the next. Hormone changes are one cause of these emotional changes. Also, the demands of a new baby, along with visits from relatives or other family needs, add to a mother's stress. The \"baby blues\" often peak around the fourth day. Then they ease up in less than 2 weeks. · If your moodiness or anxiety lasts for more than 2 weeks, or if you feel like life is not worth living, you may have postpartum depression. This is different for each mother. Some mothers with serious depression may worry intensely about their infant's well-being. Others may feel distant from their child. Some mothers might even feel that they might harm their baby. A mother may have signs of paranoia, wondering if someone is watching her. · With all the changes in your life, you may not know if you are depressed. Pregnancy sometimes causes changes in how you feel that are similar to the symptoms of depression. · Symptoms of depression include:  · Feeling sad or hopeless and losing interest in daily activities. These are the most common symptoms of depression. · Sleeping too much or not enough. · Feeling tired. You may feel as if you have no energy. · Eating too much or too little. · [employer] ([employer]) offers a Warm line; Chat with the Expert phone sessions; Information and Articles about Pregnancy and Postpartum Mood Disorders; Comprehensive List of Free Support Groups; Knowledgeable local coordinators who will offer support, information, and resources; Guide to Resources on [employer]; Calendar of events in the  mood disorders community; Latest News and Research; and [facility] [address] for [employer]. Remember - You are not alone; You are not to blame; With help, you will be well. 5-150-480-PPD(4705). WWW. POSTPARTUM. NET    · Writing or talking about death, such as writing suicide notes or talking about guns, knives, or pills. Keep the numbers for these national suicide hotlines: 3-878-804-TALK (2-837.367.5203) and 3-459-LPBLXPB (0-543.997.4191).  If you or someone you know talks about suicide or feeling hopeless, get help right away. Constipation and Hemorrhoids  Drink plenty of fluids, enough so that your urine is light yellow or clear like water. If you have kidney, heart, or liver disease and have to limit fluids, talk with your doctor before you increase the amount of fluids you drink. · Eat plenty of fiber each day. Have a bran muffin or bran cereal for breakfast, and try eating a piece of fruit for a mid-afternoon snack. · For painful, itchy hemorrhoids, put ice or a cold pack on the area several times a day for 10 minutes at a time. Follow this by putting a warm compress on the area for another 10 to 20 minutes or by sitting in a shallow, warm bath. When should you call for help? Call 911 anytime you think you may need emergency care. For example, call if:  · You are thinking of hurting yourself, your baby, or anyone else. · You passed out (lost consciousness). · You have symptoms of a blood clot in your lung (called a pulmonary embolism). These may include:  · Sudden chest pain. · Trouble breathing. · Coughing up blood. Call your doctor now or seek immediate medical care if:  · You have severe vaginal bleeding. · You are soaking through a pad each hour for 2 or more hours. · Your vaginal bleeding seems to be getting heavier or is still bright red 4 days after delivery. · You are dizzy or lightheaded, or you feel like you may faint. · You are vomiting or cannot keep fluids down. · You have a fever. · You have new or more belly pain. · You pass tissue (not just blood). · Your vaginal discharge smells bad. · Your belly feels tender or full and hard. · Your breasts are continuously painful or red. · You feel sad, anxious, or hopeless for more than a few days. · You have sudden, severe pain in your belly.   · You have symptoms of a blood clot in your leg (called a deep vein thrombosis), such as:  · Pain in your calf, back of the knee, thigh, or groin.  · Redness and swelling in your leg or groin. · You have symptoms of preeclampsia, such as:  · Sudden swelling of your face, hands, or feet. · New vision problems (such as dimness or blurring). · A severe headache. · Your blood pressure is higher than it should be or rises suddenly. · You have new nausea or vomiting. Watch closely for changes in your health, and be sure to contact your doctor if you have any problems. Additional Information:  Not applicable    These are general instructions for a healthy lifestyle:    No smoking/ No tobacco products/ Avoid exposure to second hand smoke    Surgeon General's Warning:  Quitting smoking now greatly reduces serious risk to your health. Obesity, smoking, and sedentary lifestyle greatly increases your risk for illness    A healthy diet, regular physical exercise & weight monitoring are important for maintaining a healthy lifestyle    Recognize signs and symptoms of STROKE:    F-face looks uneven    A-arms unable to move or move unevenly    S-speech slurred or non-existent    T-time-call 911 as soon as signs and symptoms begin - DO NOT go       back to bed or wait to see if you get better - TIME IS BRAIN. I have had the opportunity to make my options or choices for discharge. I have received and understand these instructions. We know that all of us are dealing with a tremendous amount of uncertainty, confusion and disruption to our daily lives, which may result in increased anxiety, depression and fear. If you are feeling unsettled or worse, please know that we are here to help. During this time of increased caution and care for one another, [facility] [place] ([address] [place] [address]) is offering virtual support groups to ALL MOTHERS in [place] regardless of the age of your child/children as a way to help weather this emotional storm together. Social support is an important part of self-care during this time of physical distancing.   Virtual postpartum support group meetings available at www. postpartumva.org  Warm Line: 597.190.2882    Breastfeeding Support Groups (virtual)  1st and 3rd Wednesday of each month  2nd and 4th Tuesday of each month    [place] at [email or web address] under the \"About Us\" and \"Classes and Events tabs\"

## 2021-03-03 NOTE — ROUTINE PROCESS
0730: Bedside shift change report given to GINA Bose RN (oncoming nurse) by Stoney Rene RN (offgoing nurse). Report included the following information SBAR.  
4959: Discharge instructions reviewed with pt and all questions answered. Follow up in 6 weeks with Zack Gomez CNM. Pt discharged in wheelchair by volunteers.

## 2021-03-04 NOTE — TELEPHONE ENCOUNTER
Pt called states that she is unable to  her rx for motrin and norco due to insurnace not being accepted at the [address] she requested. Pt requesting meds be resent to [facility] near her home. [address] called - message left as they are closed to cancel norco script -     Office info and prescribing provider info left on recorder.     New scripts sent to [facility] on [address] in [address] per pt request.     Hudson Gupta CNM

## 2021-03-09 ENCOUNTER — TELEPHONE (OUTPATIENT)
Dept: OBGYN CLINIC | Age: 26
End: 2021-03-09

## 2021-03-09 NOTE — TELEPHONE ENCOUNTER
Spoke to patient and confirmed that she did  her prescriptions for Norco and Ibuprofen. Pt denies any other needs or concerns at this time.

## 2021-04-09 RX ORDER — IBUPROFEN 800 MG/1
TABLET ORAL
Qty: 90 TAB | Refills: 1 | Status: SHIPPED | OUTPATIENT
Start: 2021-04-09 | End: 2021-11-22

## 2021-04-13 ENCOUNTER — OFFICE VISIT (OUTPATIENT)
Dept: OBGYN CLINIC | Age: 26
End: 2021-04-13
Payer: COMMERCIAL

## 2021-04-13 VITALS — BODY MASS INDEX: 30.04 KG/M2 | SYSTOLIC BLOOD PRESSURE: 118 MMHG | DIASTOLIC BLOOD PRESSURE: 88 MMHG | WEIGHT: 175 LBS

## 2021-04-13 DIAGNOSIS — Z01.419 WELL WOMAN EXAM: Primary | ICD-10-CM

## 2021-04-13 DIAGNOSIS — Z11.51 SPECIAL SCREENING EXAMINATION FOR HUMAN PAPILLOMAVIRUS (HPV): ICD-10-CM

## 2021-04-13 DIAGNOSIS — Z30.430 VISIT FOR INSERTION OF INTRAUTERINE DEVICE: ICD-10-CM

## 2021-04-13 PROCEDURE — 0503F POSTPARTUM CARE VISIT: CPT | Performed by: ADVANCED PRACTICE MIDWIFE

## 2021-04-13 PROCEDURE — 58300 INSERT INTRAUTERINE DEVICE: CPT | Performed by: ADVANCED PRACTICE MIDWIFE

## 2021-04-13 NOTE — PROGRESS NOTES
Postpartum evaluation    Jaguar Ramey is a 30 y.o. female who presents for a postpartum exam.     She is now 6 weeks post normal spontaneous vaginal delivery. Her baby is doing well. She has had no menses since delivery. She has had the following significant problems since her delivery: none    The patient is breast feeding without difficulty. The patient would like to use IUD for birth control. She is currently taking: no medications. She is due for her next AE in 12 months. There were no vitals taken for this visit.     PHYSICAL EXAMINATION    Constitutional  · Appearance: well-nourished, well developed, alert, in no acute distress    HENT  · Head and Face: appears normal    Gastrointestinal  · Abdominal Examination: abdomen non-tender to palpation, normal bowel sounds, no masses present  · Liver and spleen: no hepatomegaly present, spleen not palpable  · Hernias: no hernias identified    Genitourinary  · External Genitalia: normal appearance for age, no discharge present, no tenderness present, no inflammatory lesions present, no masses present, no atrophy present  · Vagina: normal vaginal vault without central or paravaginal defects, no discharge present, no inflammatory lesions present, no masses present  · Bladder: non-tender to palpation  · Urethra: appears normal  · Cervix: normal   · Uterus: normal size, shape and consistency  · Adnexa: no adnexal tenderness present, no adnexal masses present  · Perineum: perineum within normal limits, no evidence of trauma, no rashes or skin lesions present  · Anus: anus within normal limits, no hemorrhoids present  · Inguinal Lymph Nodes: no lymphadenopathy present    Skin  · General Inspection: no rash, no lesions identified    Neurologic/Psychiatric  · Mental Status:  · Orientation: grossly oriented to person, place and time  · Mood and Affect: mood normal, affect appropriate  · EPDS- 8     Assessment:  Normal postpartum check    Plan:  Patient declines presence of chaperone during today's visit. RTO for AE. Rx for contraception: IUD            Mirena IUD INSERTION  Indications:  Hilary Mcnamara is a ,  30 y.o. female WHITE No LMP recorded. Her LMP was normal in duration and amount of flow. She  presents for insertion of an IUD. The risks, benefits and alternatives of IUD insertion were discussed in detail at last visit. She also has reviewed Mirena information. She has elected to proceed with the insertion today and she states she has no further questions. A urine pregnancy test was negative No components found for: SPEP, UPEP  Procedure: The pelvic exam revealed normal external genitalia. On bimanual exam the uterus was anteverted and normal in size with no tenderness present. A speculum was inserted into the vagina and the cervix was visualized. The cervix was prepped with zephiran solution. The anterior lip of the cervix was grasped with a single toothed tenaculum. The uterus was sounded with a Sanchez sound to 7 centimeters. A Mirena was then inserted without difficulty. The string was cut to 3 centimeters. She experienced a mild  amount of cramping. Post Procedure Status:   She tolerated the procedure with mild discomfort. The patient was observed for 5 minutes after the insertion. There were no complications. Patient was discharged in stable condition. The patient received Mirena lot number [id number].       Penny Preston CNM

## 2021-04-13 NOTE — PATIENT INSTRUCTIONS
Postpartum: Care Instructions  Your Care Instructions  After childbirth (postpartum period), your body goes through many changes. Some of these changes happen over several weeks. In the hours after delivery, your body will begin to recover from childbirth while it prepares to breastfeed your . You may feel emotional during this time. Your hormones can shift your mood without warning for no clear reason. In the first couple of weeks after childbirth, many women have emotions that change from happy to sad. You may find it hard to sleep. You may cry a lot. This is called the \"baby blues. \" These overwhelming emotions often go away within a couple of days or weeks. But it's important to discuss your feelings with your doctor. It is easy to get too tired and overwhelmed during the first weeks after childbirth. Don't try to do too much. Get rest whenever you can, accept help from others, and eat well and drink plenty of fluids. In the first couple of weeks after giving birth, your doctor or midwife may want to check in with you and make a plan for any follow-up care you may need. You will likely have a complete postpartum visit in the first 3 months after delivery. At that time, your doctor or midwife will check on your recovery from childbirth. He or she will also see how you are doing with your emotions and talk about your concerns or questions. Follow-up care is a key part of your treatment and safety. Be sure to make and go to all appointments, and call your doctor if you are having problems. It's also a good idea to know your test results and keep a list of the medicines you take. How can you care for yourself at home? · Sleep or rest when your baby sleeps. · Get help with household chores from family or friends, if you can. Do not try to do it all yourself. · If you have hemorrhoids or swelling or pain around the opening of your vagina, try using cold and heat.  You can put ice or a cold pack on the area for 10 to 20 minutes at a time. Put a thin cloth between the ice and your skin. Also try sitting in a few inches of warm water (sitz bath) 3 times a day and after bowel movements. · Take pain medicines exactly as directed. ? If the doctor gave you a prescription medicine for pain, take it as prescribed. ? If you are not taking a prescription pain medicine, ask your doctor if you can take an over-the-counter medicine. · Eat more fiber to avoid constipation. Include foods such as whole-grain breads and cereals, raw vegetables, raw and dried fruits, and beans. · Drink plenty of fluids. If you have kidney, heart, or liver disease and have to limit fluids, talk with your doctor before you increase the amount of fluids you drink. · Do not rinse inside your vagina with fluids (douche). · If you have stitches, keep the area clean by pouring or spraying warm water over the area outside your vagina and anus after you use the toilet. · Keep a list of questions to ask your doctor or midwife. Your questions might be about:  ? Changes in your breasts, such as lumps or soreness. ? When to expect your menstrual period to start again. ? What form of birth control is best for you. ? Weight you have put on during the pregnancy. ? Exercise options. ? What foods and drinks are best for you, especially if you are breastfeeding. ? Problems you might be having with breastfeeding. ? When you can have sex. Some women may want to talk about lubricants for the vagina. ? Any feelings of sadness or restlessness that you are having. When should you call for help? Call 911 anytime you think you may need emergency care. For example, call if:    · You have thoughts of harming yourself, your baby, or another person.     · You passed out (lost consciousness).     · You have chest pain, are short of breath, or cough up blood.     · You have a seizure.    Call your doctor now or seek immediate medical care if:    · Your vaginal bleeding seems to be getting heavier.     · You are dizzy or lightheaded, or you feel like you may faint.     · You have a fever.     · You have new or more belly pain.     · You have symptoms of a blood clot in your leg (called a deep vein thrombosis), such as:  ? Pain in the calf, back of the knee, thigh, or groin. ? Redness and swelling in your leg or groin.     · You have signs of preeclampsia, such as:  ? Sudden swelling of your face, hands, or feet. ? New vision problems (such as dimness, blurring, or seeing spots). ? A severe headache. Watch closely for changes in your health, and be sure to contact your doctor if:    · You have new or worse vaginal discharge.     · You feel sad or depressed.     · You are having problems with your breasts or breastfeeding. Where can you learn more? Go to http://www.gray.com/  Enter B775 in the search box to learn more about \"Postpartum: Care Instructions. \"  Current as of: October 8, 2020               Content Version: 12.8  © 2006-2021 [employer]. Care instructions adapted under license by [employer] (which disclaims liability or warranty for this information). If you have questions about a medical condition or this instruction, always ask your healthcare professional. [address] any warranty or liability for your use of this information.

## 2021-07-08 RX ORDER — ESCITALOPRAM OXALATE 10 MG/1
10 TABLET ORAL DAILY
Qty: 90 TABLET | Refills: 5 | Status: SHIPPED | OUTPATIENT
Start: 2021-07-08 | End: 2022-11-01 | Stop reason: ALTCHOICE

## 2021-07-08 RX ORDER — ACETAMINOPHEN AND CODEINE PHOSPHATE 120; 12 MG/5ML; MG/5ML
1 SOLUTION ORAL DAILY
Qty: 3 DOSE PACK | Refills: 2 | Status: SHIPPED | OUTPATIENT
Start: 2021-07-08 | End: 2022-11-01 | Stop reason: ALTCHOICE

## 2021-07-19 ENCOUNTER — OFFICE VISIT (OUTPATIENT)
Dept: OBGYN CLINIC | Age: 26
End: 2021-07-19
Payer: COMMERCIAL

## 2021-07-19 VITALS — SYSTOLIC BLOOD PRESSURE: 116 MMHG | WEIGHT: 184 LBS | DIASTOLIC BLOOD PRESSURE: 82 MMHG | BODY MASS INDEX: 31.58 KG/M2

## 2021-07-19 DIAGNOSIS — L65.9 HAIR LOSS: ICD-10-CM

## 2021-07-19 DIAGNOSIS — N93.9 VAGINAL BLEEDING: Primary | ICD-10-CM

## 2021-07-19 LAB
ERYTHROCYTE [DISTWIDTH] IN BLOOD BY AUTOMATED COUNT: 13.5 % (ref 11.5–14.5)
HCT VFR BLD AUTO: 38.7 % (ref 35–47)
HGB BLD-MCNC: 12.7 G/DL (ref 11.5–16)
MCH RBC QN AUTO: 28.9 PG (ref 26–34)
MCHC RBC AUTO-ENTMCNC: 32.8 G/DL (ref 30–36.5)
MCV RBC AUTO: 88.2 FL (ref 80–99)
NRBC # BLD: 0 K/UL (ref 0–0.01)
NRBC BLD-RTO: 0 PER 100 WBC
PLATELET # BLD AUTO: 357 K/UL (ref 150–400)
PMV BLD AUTO: 11.4 FL (ref 8.9–12.9)
RBC # BLD AUTO: 4.39 M/UL (ref 3.8–5.2)
T4 FREE SERPL-MCNC: 1 NG/DL (ref 0.8–1.5)
TSH SERPL DL<=0.05 MIU/L-ACNC: 0.91 UIU/ML (ref 0.36–3.74)
WBC # BLD AUTO: 6 K/UL (ref 3.6–11)

## 2021-07-19 PROCEDURE — 99212 OFFICE O/P EST SF 10 MIN: CPT | Performed by: ADVANCED PRACTICE MIDWIFE

## 2021-07-19 NOTE — PATIENT INSTRUCTIONS
Combination Birth Control Pills: Care Instructions  Your Care Instructions     Combination birth control pills are used to prevent pregnancy. They give you a regular dose of the hormones estrogen and progestin. You take a hormone pill every day to prevent pregnancy. Birth control pills come in packs. The most common type has 3 weeks of hormone pills. Some packs have sugar pills (they do not contain any hormones) for the fourth week. During that fourth no-hormone week, you have your period. After the fourth week (28 days), you start a new pack. Some birth control pills are packaged in different ways. For example, some have hormone pills for the fourth week instead of sugar pills. Taking hormones for the entire month causes you to not have periods or to have fewer periods. Others are packaged so that you have a period every 3 months. Your doctor will tell you what type of pills you have. Follow-up care is a key part of your treatment and safety. Be sure to make and go to all appointments, and call your doctor if you are having problems. It's also a good idea to know your test results and keep a list of the medicines you take. How can you care for yourself at home? How do you take the pill? · Follow your doctor's instructions about when to start taking your pills. Use backup birth control, such as a condom, or don't have intercourse for 7 days after you start your pills. · Take your pills every day, at about the same time of day. To help yourself do this, try to take them when you do something else every day, such as brushing your teeth. What if you forget to take a pill? Always read the label for specific instructions, or call your doctor. Here are some basic guidelines:  · If you miss 1 hormone pill, take it as soon as you remember. Ask your doctor if you may need to use a backup birth control method, such as a condom, or not have intercourse.   · If you miss 2 or more hormone pills, take one as soon as you remember you forgot them. Then read the pill label or call your doctor about instructions on how to take your missed pills. Use a backup method of birth control or don't have intercourse for 7 days. Pregnancy is more likely if you miss more than 1 pill. · If you had intercourse, you can use emergency contraception to help prevent pregnancy. The most effective emergency contraception is the copper IUD (inserted by a doctor). You can also get emergency contraceptive pills without a prescription at [facility]. What else do you need to know? · The pill can have side effects. ? You may have very light or skipped periods. ? You may have bleeding between periods (spotting). This usually decreases after 3 to 4 months. ? You may have mood changes, less interest in sex, or weight gain. · The pill may reduce acne, heavy bleeding and cramping, and symptoms of premenstrual syndrome. · Check with your doctor before you use any other medicines, including over-the-counter medicines, vitamins, herbal products, and supplements. Birth control hormones may not work as well to prevent pregnancy when combined with other medicines. · The pill doesn't protect against sexually transmitted infection (STIs), such as herpes or HIV/AIDS. If you're not sure whether your sex partner might have an STI, use a condom to protect against disease. When should you call for help? Call your doctor now or seek immediate medical care if:    · You have severe belly pain.     · You have signs of a blood clot, such as:  ? Pain in your calf, back of the knee, thigh, or groin. ? Redness and swelling in your leg or groin.     · You have blurred vision or other problems seeing.     · You have a severe headache.     · You have severe trouble breathing.    Watch closely for changes in your health, and be sure to contact your doctor if:    · You think you might be pregnant.     · You think you may be depressed.     · You think you may have been exposed to or have a sexually transmitted infection. Where can you learn more? Go to http://www.gray.com/  Enter Z218 in the search box to learn more about \"Combination Birth Control Pills: Care Instructions. \"  Current as of: October 8, 2020               Content Version: 12.8  © 8538-8022 [employer]. Care instructions adapted under license by [employer] (which disclaims liability or warranty for this information). If you have questions about a medical condition or this instruction, always ask your healthcare professional. [address] any warranty or liability for your use of this information.

## 2021-07-19 NOTE — PROGRESS NOTES
Dwain Anton is a 30 y.o. female who complains of vaginal bleeding since 4/13/21 and hair loss. Her current method of family planning is OCP (estrogen/progesterone). The patient is sexually active. She developed this problem after having her baby. She has noticed her hair continuing to thin and having bald spots and no slowing of loss     Irregular bleeding since placement of IUD -       Her relevant past medical history:   Past Medical History:   Diagnosis Date    Anemia     Asthma     Postpartum depression     depression and anxiety        Past Surgical History:   Procedure Laterality Date    HX OTHER SURGICAL  2011    10 ear surgeries, tubes, regraft ear drum    HX TYMPANOSTOMY       Social History     Occupational History    Not on file   Tobacco Use    Smoking status: Never Smoker    Smokeless tobacco: Never Used   Substance and Sexual Activity    Alcohol use: No    Drug use: No    Sexual activity: Yes     Partners: Male     Birth control/protection: None     Family History   Problem Relation Age of Onset    Other Sister         Heart condition- 'hole' in heart    Colon Cancer Maternal Grandmother         Diagnosed in 63's    No Known Problems Mother     No Known Problems Father        Allergies   Allergen Reactions    Ceftin [Cefuroxime Axetil] Hives     Prior to Admission medications    Medication Sig Start Date End Date Taking? Authorizing Provider   escitalopram oxalate (LEXAPRO) 10 mg tablet Take 1 Tablet by mouth daily. 7/8/21   Karyn Arambula CNM   norethindrone [facility]) 0.35 mg tab Take 1 Tablet by mouth daily. 7/8/21   Karyn Arambula CNM   ibuprofen (MOTRIN) 800 mg tablet TAKE 1 TABLET BY MOUTH EVERY 6 HOURS AS NEEDED FOR PAIN 4/9/21   Karyn Arambula CNM   ibuprofen (MOTRIN) 800 mg tablet Take 1 Tab by mouth every eight (8) hours.  3/3/21   Karyn Arambula CNM   ibuprofen (MOTRIN) 800 mg tablet Take 1 Tab by mouth every six (6) hours as needed for Pain. 3/3/21   Mauricio Amador CNM        Review of Systems - History obtained from the patient  Constitutional: negative for weight loss, fever, night sweats  HEENT: negative for hearing loss, earache, congestion, snoring, sorethroat  CV: negative for chest pain, palpitations, edema  Resp: negative for cough, shortness of breath, wheezing  Breast: negative for breast lumps, nipple discharge, galactorrhea  GI: negative for change in bowel habits, abdominal pain, black or bloody stools  : negative for frequency, dysuria, hematuria  MSK: negative for back pain, joint pain, muscle pain  Skin: negative for itching, rash, hives  Neuro: negative for dizziness, headache, confusion, weakness  Psych: negative for anxiety, depression, change in mood  Heme/lymph: negative for bleeding, bruising, pallor      Objective: There were no vitals taken for this visit.        PHYSICAL EXAMINATION    Constitutional  · Appearance: well-nourished, well developed, alert, in no acute distress    HENT  · Head and Face: appears normal    Neck  · Inspection/Palpation: normal appearance, no masses or tenderness  · Lymph Nodes: no lymphadenopathy present  · Thyroid: gland size normal, nontender, no nodules or masses present on palpation  ·   Breasts  · Inspection of Breasts: breasts symmetrical, no skin changes, no discharge present, nipple appearance normal, no skin retraction present  · Palpation of Breasts and Axillae: no masses present on palpation, no breast tenderness  · Axillary Lymph Nodes: no lymphadenopathy present    Gastrointestinal  · Abdominal Examination: abdomen non-tender to palpation, normal bowel sounds, no masses present  · Liver and spleen: no hepatomegaly present, spleen not palpable  · Hernias: no hernias identified    Genitourinary  · External Genitalia: normal appearance for age, no discharge present, no tenderness present, no inflammatory lesions present, no masses present, no atrophy present  · Vagina: normal vaginal vault without central or paravaginal defects, no discharge present, no inflammatory lesions present, no masses present  · Bladder: non-tender to palpation  · Urethra: appears normal  · Cervix: normal   · Uterus: normal size, shape and consistency  · Adnexa: no adnexal tenderness present, no adnexal masses present  · Perineum: perineum within normal limits, no evidence of trauma, no rashes or skin lesions present  · Anus: anus within normal limits, no hemorrhoids present  · Inguinal Lymph Nodes: no lymphadenopathy present    Skin  · General Inspection: no rash, no lesions identified    Neurologic/Psychiatric  · Mental Status:  · Orientation: grossly oriented to person, place and time  · Mood and Affect: mood normal, affect appropriate    Assessment:   Post partum hair loss   Abnormal uterine bleeding with IUD     Plan:   TSH, CBC  Refill lexapro  progestin BC for 2 months with R/B/A   Patient declines presence of chaperone during today's visit.       Hattie Montana CNM

## 2021-07-20 LAB
COMMENT, HOLDF: NORMAL
SAMPLES BEING HELD,HOLD: NORMAL

## 2021-07-20 NOTE — PROGRESS NOTES
Hi friend,   Your labs look normal! The hair loss is unfortunate but it looks like it is just post partum. I would recommend increasing your protein intake to 75 grams per day. This should help to slow the loss and grow new hair.      Zack

## 2021-10-29 ENCOUNTER — TELEPHONE (OUTPATIENT)
Dept: OBGYN CLINIC | Age: 26
End: 2021-10-29

## 2021-10-29 RX ORDER — SULFAMETHOXAZOLE AND TRIMETHOPRIM 800; 160 MG/1; MG/1
1 TABLET ORAL 2 TIMES DAILY
Qty: 20 TABLET | Refills: 0 | Status: SHIPPED | OUTPATIENT
Start: 2021-10-29 | End: 2021-11-08

## 2021-10-29 RX ORDER — DICLOXACILLIN SODIUM 250 MG/1
500 CAPSULE ORAL
Qty: 80 CAPSULE | Refills: 0 | Status: CANCELLED | OUTPATIENT
Start: 2021-10-29 | End: 2021-11-08

## 2021-11-22 ENCOUNTER — OFFICE VISIT (OUTPATIENT)
Dept: OBGYN CLINIC | Age: 26
End: 2021-11-22
Payer: COMMERCIAL

## 2021-11-22 VITALS
HEIGHT: 64 IN | SYSTOLIC BLOOD PRESSURE: 134 MMHG | WEIGHT: 191 LBS | BODY MASS INDEX: 32.61 KG/M2 | DIASTOLIC BLOOD PRESSURE: 82 MMHG

## 2021-11-22 DIAGNOSIS — N92.6 IRREGULAR BLEEDING: Primary | ICD-10-CM

## 2021-11-22 DIAGNOSIS — Z30.432 ENCOUNTER FOR IUD REMOVAL: ICD-10-CM

## 2021-11-22 PROCEDURE — 99213 OFFICE O/P EST LOW 20 MIN: CPT | Performed by: ADVANCED PRACTICE MIDWIFE

## 2021-11-22 PROCEDURE — 58301 REMOVE INTRAUTERINE DEVICE: CPT | Performed by: ADVANCED PRACTICE MIDWIFE

## 2021-11-22 NOTE — PROGRESS NOTES
Elina Galeas is a 27 y.o. female who complains of continuous bleeding on IUD- has tried multiple MADHAV, and has continuous spotting as well. Desires removal of IUD and would like to go without birth control for now to see how bleeding goes without. States she is worried bc she has a hx of ovarian cyst when not on birth control. She has spoken with the midwife a couple of times since insertion in April about this issue. Also about hair loss. Her relevant past medical history:   Past Medical History:   Diagnosis Date    Anemia     Asthma     Postpartum depression     depression and anxiety        Past Surgical History:   Procedure Laterality Date    HX OTHER SURGICAL  2011    10 ear surgeries, tubes, regraft ear drum    HX TYMPANOSTOMY       Social History     Occupational History    Not on file   Tobacco Use    Smoking status: Never Smoker    Smokeless tobacco: Never Used   Substance and Sexual Activity    Alcohol use: No    Drug use: No    Sexual activity: Yes     Partners: Male     Birth control/protection: Pill     Family History   Problem Relation Age of Onset    Other Sister         Heart condition- 'hole' in heart    Colon Cancer Maternal Grandmother         Diagnosed in 63's    No Known Problems Mother     No Known Problems Father        Allergies   Allergen Reactions    Ceftin [Cefuroxime Axetil] Hives     Prior to Admission medications    Medication Sig Start Date End Date Taking? Authorizing Provider   escitalopram oxalate (LEXAPRO) 10 mg tablet Take 1 Tablet by mouth daily. 7/8/21   Niki Ro, CNM   norethindrone [facility]) 0.35 mg tab Take 1 Tablet by mouth daily.  7/8/21   Niki Ro, CNM   ibuprofen (MOTRIN) 800 mg tablet TAKE 1 TABLET BY MOUTH EVERY 6 HOURS AS NEEDED FOR PAIN  Patient not taking: Reported on 7/19/2021 4/9/21 11/22/21  Niki Ro, CNM   ibuprofen (MOTRIN) 800 mg tablet Take 1 Tab by mouth every eight (8) hours. Patient not taking: Reported on 7/19/2021 3/3/21 11/22/21  Elizabeth Dobbs CNM   ibuprofen (MOTRIN) 800 mg tablet Take 1 Tab by mouth every six (6) hours as needed for Pain. Patient not taking: Reported on 7/19/2021 3/3/21 11/22/21  Elizabeth Dobbs CNM        Review of Systems - History obtained from the patient  Constitutional: negative for weight loss, fever, night sweats  HEENT: negative for hearing loss, earache, congestion, snoring, sorethroat  CV: negative for chest pain, palpitations, edema  Resp: negative for cough, shortness of breath, wheezing  Breast: negative for breast lumps, nipple discharge, galactorrhea  GI: negative for change in bowel habits, abdominal pain, black or bloody stools  : negative for frequency, dysuria, hematuria  MSK: negative for back pain, joint pain, muscle pain  Skin: negative for itching, rash, hives  Neuro: negative for dizziness, headache, confusion, weakness  Psych: negative for anxiety, depression, change in mood  Heme/lymph: negative for bleeding, bruising, pallor      Objective:  Visit Vitals  /82   Ht 5' 4\" (1.626 m)   Wt 191 lb (86.6 kg)   BMI 32.79 kg/m²          PHYSICAL EXAMINATION    Constitutional  · Appearance: well-nourished, well developed, alert, in no acute distress    HENT  · Head and Face: appears normal    Neck  · Inspection/Palpation: normal appearance      Skin  · General Inspection: no rash, no lesions identified    Neurologic/Psychiatric  · Mental Status:  · Orientation: grossly oriented to person, place and time  · Mood and Affect: mood normal, affect appropriate    Assessment:     Irregular bleeding with IUD     Plan:   IUD removed per pt request - declines birth control at this time. Patient declines presence of chaperone during today's visit.      Sandor Kaplan CNM

## 2022-03-18 PROBLEM — O42.90 AMNIOTIC FLUID LEAKING: Status: ACTIVE | Noted: 2021-03-02

## 2022-03-20 PROBLEM — Z3A.37 37 WEEKS GESTATION OF PREGNANCY: Status: ACTIVE | Noted: 2021-03-02

## 2022-03-20 PROBLEM — Z3A.08 8 WEEKS GESTATION OF PREGNANCY: Status: ACTIVE | Noted: 2020-08-07

## 2022-05-05 RX ORDER — FLUCONAZOLE 150 MG/1
150 TABLET ORAL DAILY
Qty: 1 TABLET | Refills: 0 | Status: SHIPPED | OUTPATIENT
Start: 2022-05-05 | End: 2022-05-06

## 2022-11-01 ENCOUNTER — OFFICE VISIT (OUTPATIENT)
Dept: OBGYN CLINIC | Age: 27
End: 2022-11-01
Payer: COMMERCIAL

## 2022-11-01 VITALS
HEIGHT: 64 IN | SYSTOLIC BLOOD PRESSURE: 118 MMHG | DIASTOLIC BLOOD PRESSURE: 82 MMHG | WEIGHT: 206.8 LBS | BODY MASS INDEX: 35.3 KG/M2

## 2022-11-01 DIAGNOSIS — Z12.4 SCREENING FOR CERVICAL CANCER: Primary | ICD-10-CM

## 2022-11-01 DIAGNOSIS — Z01.419 WELL WOMAN EXAM: ICD-10-CM

## 2022-11-01 PROCEDURE — 99395 PREV VISIT EST AGE 18-39: CPT | Performed by: ADVANCED PRACTICE MIDWIFE

## 2022-11-01 RX ORDER — NORGESTIMATE AND ETHINYL ESTRADIOL 7DAYSX3 28
1 KIT ORAL DAILY
Qty: 3 DOSE PACK | Refills: 3 | Status: SHIPPED | OUTPATIENT
Start: 2022-11-01

## 2022-11-01 NOTE — PROGRESS NOTES
Annual exam ages [address] [facility] BRYSON Spears is a ,  32 y.o. female   Patient's last menstrual period was 10/17/2022. She presents for her annual checkup. She is having no significant problems. With regard to the Gardasil vaccine, she has not received it yet. Menstrual status:    Her periods are normal in flow. She is using three to ten pads or tampons per day, usually regular with a 26-32 day interval with 3-7 day duration. She does not have dysmenorrhea. She reports no premenstrual symptoms. Contraception:    The current method of family planning is none. Sexual history:    She  reports being sexually active and has had partner(s) who are male. She reports using the following method of birth control/protection: Pill. Medical conditions:    Since her last annual GYN exam about three years ago, she has had the following changes in her health history: none. Surgical history confirmed with patient. has a past surgical history that includes hx tympanostomy and hx other surgical (). Pap and Mammogram History:    Her most recent Pap smear was normal, obtained 3 year(s) ago. The patient has never had a mammogram.    Breast Cancer History/Substance Abuse: negative    Past Medical History:   Diagnosis Date    Anemia     Asthma     Postpartum depression     depression and anxiety     Past Surgical History:   Procedure Laterality Date    HX OTHER SURGICAL      10 ear surgeries, tubes, regraft ear drum    HX TYMPANOSTOMY         Current Outpatient Medications   Medication Sig Dispense Refill    escitalopram oxalate (LEXAPRO) 10 mg tablet Take 1 Tablet by mouth daily. (Patient not taking: Reported on 2022) 90 Tablet 5    norethindrone (MICRONOR) 0.35 mg tab Take 1 Tablet by mouth daily. (Patient not taking: Reported on 2022) 3 Dose Pack 2     Allergies: Ceftin [cefuroxime axetil]     Tobacco History:  reports that she has never smoked.  She has never used smokeless tobacco.  Alcohol Abuse:  reports no history of alcohol use. Drug Abuse:  reports no history of drug use. Family Medical/Cancer History:   Family History   Problem Relation Age of Onset    Other Sister         Heart condition- 'hole' in heart    Colon Cancer Maternal Grandmother         Diagnosed in 63's    No Known Problems Mother     No Known Problems Father         Review of Systems - History obtained from the patient  Constitutional: negative for weight loss, fever, night sweats  HEENT: negative for hearing loss, earache, congestion, snoring, sorethroat  CV: negative for chest pain, palpitations, edema  Resp: negative for cough, shortness of breath, wheezing  GI: negative for change in bowel habits,  black or bloody stools, cyclical pelvic pain mid cycle  : negative for frequency, dysuria, hematuria, vaginal discharge  MSK: negative for back pain, joint pain, muscle pain  Breast: negative for breast lumps, nipple discharge, galactorrhea  Skin :negative for itching, rash, hives  Neuro: negative for dizziness, headache, confusion, weakness  Psych: negative for anxiety, depression, change in mood  Heme/lymph: negative for bleeding, bruising, pallor    Physical Exam    Visit Vitals  /82   Ht 5' 4\" (1.626 m)   Wt 206 lb 12.8 oz (93.8 kg)   LMP 10/17/2022   BMI 35.50 kg/m²       Constitutional  Appearance: well-nourished, well developed, alert, in no acute distress    HENT  Head and Face: appears normal,   Eyes: Sclera clear. Conjunctiva pink, no drainage. PEARLA      Neck  Inspection/Palpation: normal appearance    Chest  Respiratory Effort: breathing unlabored      Cardiovascular  Heart:   Auscultation: regular rate    Breasts  Inspection of Breasts: breasts symmetrical, no skin changes, no discharge present, nipple appearance normal, no skin retraction present  Palpation of Breasts and Axillae: no masses present on palpation, no breast tenderness  Axillary Lymph Nodes: no lymphadenopathy present    Gastrointestinal  Abdominal Examination: abdomen non-tender to palpation, no masses present  Hernias: no hernias identified  CVA: Neg/NT Bilat    Genitourinary  External Genitalia: EGBUS normal appearance for age,  no tenderness, inflammatory lesions, masses or atrophy present  Vagina: normal vaginal vault without central or paravaginal defects, Physiologic discharge present, no inflammatory lesions or masses noted. Bladder: non-tender to palpation  Urethra: appears normal  Cervix: normal, No CMT   Uterus: normal size, shape and consistency  Adnexa: no adnexal tenderness or masses present bilat  Perineum: perineum normal, no evidence of trauma, rashes or skin lesions present  Anus: normal appearance without fissures, lesions or hemorrhoids present  Inguinal Lymph Nodes: no lymphadenopathy present    Skin  General Inspection: no rash, no lesions identified    Neurologic/Psychiatric  Mental Status:  Orientation: grossly oriented to person, place and time  Mood and Affect: mood normal, affect appropriate    . Assessment:  Routine gynecologic examination  Her current medical status is satisfactory with no evidence of significant gynecologic issues. Suspect ovarian cyst- has a hx and cyclical pain after ovulation - will try tri sprintec - tricyclic pills worked best for her prior to having her last baby.   Did NOT like mirena, does not want depo to avoid weight gain     Plan:  Counseled re: diet, exercise, healthy lifestyle, safe sex practices and STI prevention  Offered LARC for Contraception  Return for yearly wellness visits or prn  Gardisil counseling provided  Pt counseled regarding co-testing for high risk HPV with pap  Mammogram Screening recommendation starting age 36   Colorectal Screening recommendation starting age [facility] [address] [place] [address]

## 2022-11-09 LAB
CYTOLOGIST CVX/VAG CYTO: NORMAL
CYTOLOGY CVX/VAG DOC CYTO: NORMAL
CYTOLOGY CVX/VAG DOC THIN PREP: NORMAL
DX ICD CODE: NORMAL
LABCORP, 190119: NORMAL
Lab: NORMAL
Lab: NORMAL
OTHER STN SPEC: NORMAL
STAT OF ADQ CVX/VAG CYTO-IMP: NORMAL

## 2023-05-19 RX ORDER — NORGESTIMATE AND ETHINYL ESTRADIOL 7DAYSX3 28
1 KIT ORAL DAILY
COMMUNITY
Start: 2022-11-01

## 2023-07-20 ENCOUNTER — TELEPHONE (OUTPATIENT)
Facility: HOSPITAL | Age: 28
End: 2023-07-20

## 2023-07-20 RX ORDER — FLUCONAZOLE 150 MG/1
150 TABLET ORAL ONCE
Qty: 1 TABLET | Refills: 0 | Status: SHIPPED | OUTPATIENT
Start: 2023-07-20 | End: 2023-07-20

## 2023-07-27 NOTE — TELEPHONE ENCOUNTER
Pt called with classic symptoms of yeast infection.  Rx sent in    [facility], [address] [place] [address]

## 2023-08-11 ENCOUNTER — OFFICE VISIT (OUTPATIENT)
Age: 28
End: 2023-08-11
Payer: COMMERCIAL

## 2023-08-11 VITALS — WEIGHT: 212 LBS | BODY MASS INDEX: 36.39 KG/M2

## 2023-08-11 DIAGNOSIS — N92.0 MENORRHAGIA WITH REGULAR CYCLE: Primary | ICD-10-CM

## 2023-08-11 LAB
25(OH)D3 SERPL-MCNC: 27.3 NG/ML (ref 30–100)
ERYTHROCYTE [DISTWIDTH] IN BLOOD BY AUTOMATED COUNT: 13.2 % (ref 11.5–14.5)
HCT VFR BLD AUTO: 37.8 % (ref 35–47)
HGB BLD-MCNC: 11.9 G/DL (ref 11.5–16)
MCH RBC QN AUTO: 27.5 PG (ref 26–34)
MCHC RBC AUTO-ENTMCNC: 31.5 G/DL (ref 30–36.5)
MCV RBC AUTO: 87.3 FL (ref 80–99)
NRBC # BLD: 0 K/UL (ref 0–0.01)
NRBC BLD-RTO: 0 PER 100 WBC
PLATELET # BLD AUTO: 345 K/UL (ref 150–400)
PMV BLD AUTO: 10.8 FL (ref 8.9–12.9)
RBC # BLD AUTO: 4.33 M/UL (ref 3.8–5.2)
T3FREE SERPL-MCNC: 2.6 PG/ML (ref 2.2–4)
T4 FREE SERPL-MCNC: 1 NG/DL (ref 0.8–1.5)
TSH SERPL DL<=0.05 MIU/L-ACNC: 0.68 UIU/ML (ref 0.36–3.74)
WBC # BLD AUTO: 7 K/UL (ref 3.6–11)

## 2023-08-11 PROCEDURE — 99213 OFFICE O/P EST LOW 20 MIN: CPT | Performed by: ADVANCED PRACTICE MIDWIFE

## 2023-08-11 NOTE — PROGRESS NOTES
Problem Visit    Yadira Cole is a 32 y.o.  presenting for problem visit. Her main concern today is dysmenorrhoea. Pt also notes since her miscarriage in May her cycles have been heavier.      Her mother and sister have a hx of endometriosis- Judi has tried multiple types of birth control pills with varying levels of effectiveness - her current pills are tri phasic and she is having heavy miserable cycles - one week out of every cycle she feels normal.    IUD caused her to bleed daily - she is not interested in this     Ob/Gyn Hx:    LMP-   Menses- regular  Contraception- pill  SA- yes      Past Medical History:   Diagnosis Date    Anemia     Asthma     Postpartum depression     depression and anxiety       Past Surgical History:   Procedure Laterality Date    OTHER SURGICAL HISTORY  2011    10 ear surgeries, tubes, regraft ear drum    TYMPANOSTOMY TUBE PLACEMENT         Family History   Problem Relation Age of Onset    No Known Problems Father     Other Sister         Heart condition- 'hole' in heart    Colon Cancer Maternal Grandmother         Diagnosed in     No Known Problems Mother        Social History     Socioeconomic History    Marital status:      Spouse name: Not on file    Number of children: Not on file    Years of education: 14yrs    Highest education level: Not on file   Occupational History    Not on file   Tobacco Use    Smoking status: Never    Smokeless tobacco: Never   Substance and Sexual Activity    Alcohol use: No    Drug use: No    Sexual activity: Not on file   Other Topics Concern    Not on file   Social History Narrative    Not on file     Social Determinants of Health     Financial Resource Strain: Not on file   Food Insecurity: Not on file   Transportation Needs: Not on file   Physical Activity: Not on file   Stress: Not on file   Social Connections: Not on file   Intimate Partner Violence: Not on file   Housing Stability: Not on file       Current Outpatient

## 2023-09-25 ENCOUNTER — TELEPHONE (OUTPATIENT)
Age: 28
End: 2023-09-25

## 2023-09-25 NOTE — TELEPHONE ENCOUNTER
Called pt to advise about [facility] pt has already switch just hasnt received new member id info

## 2023-10-02 ENCOUNTER — ROUTINE PRENATAL (OUTPATIENT)
Age: 28
End: 2023-10-02

## 2023-10-02 VITALS
HEIGHT: 64 IN | DIASTOLIC BLOOD PRESSURE: 80 MMHG | SYSTOLIC BLOOD PRESSURE: 118 MMHG | WEIGHT: 212.2 LBS | BODY MASS INDEX: 36.23 KG/M2

## 2023-10-02 DIAGNOSIS — Z36.9 UNSPECIFIED ANTENATAL SCREENING: Primary | ICD-10-CM

## 2023-10-02 DIAGNOSIS — Z3A.09 9 WEEKS GESTATION OF PREGNANCY: ICD-10-CM

## 2023-10-02 PROBLEM — Z3A.08 8 WEEKS GESTATION OF PREGNANCY: Status: RESOLVED | Noted: 2020-08-07 | Resolved: 2023-10-02

## 2023-10-02 PROCEDURE — 0500F INITIAL PRENATAL CARE VISIT: CPT | Performed by: ADVANCED PRACTICE MIDWIFE

## 2023-10-02 RX ORDER — ONDANSETRON 4 MG/1
4 TABLET, FILM COATED ORAL DAILY PRN
Qty: 30 TABLET | Refills: 5 | Status: SHIPPED | OUTPATIENT
Start: 2023-10-02

## 2023-10-02 NOTE — PROGRESS NOTES
condition- 'hole' in heart    Colon Cancer Maternal Grandmother         Diagnosed in 63's    No Known Problems Mother      OB History    Para Term  AB Living   4   4 0 0 4   SAB IAB Ectopic Molar Multiple Live Births                   Allergies   Allergen Reactions    Cefuroxime Axetil Hives     Prior to Admission medications    Medication Sig Start Date End Date Taking? Authorizing Provider   Norgestim-Eth Estrad Triphasic 0.18/0.215/0.25 MG-35 MCG TABS Take 1 tablet by mouth daily 22   Ar Automatic Reconciliation        Review of Systems: History obtained from the patient  Constitutional: negative for weight loss, fever, night sweats  HEENT: negative for hearing loss, earache, congestion, snoring, sore throat  CV: negative for chest pain, palpitations, edema  Resp: negative for cough, shortness of breath, wheezing  Breast: negative for breast lumps, nipple discharge, galactorrhea  GI: negative for change in bowel habits, abdominal pain, black or bloody stools  : negative for frequency, dysuria, hematuria, vaginal discharge  MSK: negative for back pain, joint pain, muscle pain  Skin: negative for itching, rash, hives  Neuro: negative for dizziness, headache, confusion, weakness  Psych: negative for anxiety, depression, change in mood  Heme/lymph: negative for bleeding, bruising, pallor    Objective: There were no vitals taken for this visit. Physical Exam:     Constitutional  Appearance: well-nourished, well developed, alert, in no acute distress    HENT  Head  Face: appears normal  Eyes: appear normal  Ears: normal  Mouth: normal  Lips: no lesions    Neck  Inspection/Palpation: normal appearance, no masses or tenderness  Lymph Nodes: no lymphadenopathy present  Thyroid: gland size normal, nontender, no nodules or masses present on palpation    Chest  Respiratory Effort: breathing unlabored  Auscultation: normal breath sounds    Cardiovascular  Heart:   Auscultation: regular rate and rhythm

## 2023-10-03 LAB
BACTERIA SPEC CULT: NORMAL
SERVICE CMNT-IMP: NORMAL

## 2023-10-05 LAB
C TRACH RRNA SPEC QL NAA+PROBE: NEGATIVE
N GONORRHOEA RRNA SPEC QL NAA+PROBE: NEGATIVE
SPECIMEN SOURCE: NORMAL
T VAGINALIS RRNA SPEC QL NAA+PROBE: NEGATIVE

## 2023-10-09 ENCOUNTER — ROUTINE PRENATAL (OUTPATIENT)
Age: 28
End: 2023-10-09

## 2023-10-09 VITALS — BODY MASS INDEX: 36.73 KG/M2 | WEIGHT: 214 LBS

## 2023-10-09 DIAGNOSIS — Z3A.09 9 WEEKS GESTATION OF PREGNANCY: ICD-10-CM

## 2023-10-09 DIAGNOSIS — Z36.9 UNSPECIFIED ANTENATAL SCREENING: ICD-10-CM

## 2023-10-09 DIAGNOSIS — Z34.81 PRENATAL CARE, SUBSEQUENT PREGNANCY IN FIRST TRIMESTER: Primary | ICD-10-CM

## 2023-10-09 LAB
HEP B, EXTERNAL RESULT: NEGATIVE
HIV, EXTERNAL RESULT: NON REACTIVE
RUBELLA TITER, EXTERNAL RESULT: NORMAL
T. PALLIDUM (SYPHILIS) ANTIBODY, EXTERNAL RESULT: NON REACTIVE

## 2023-10-09 PROCEDURE — 0502F SUBSEQUENT PRENATAL CARE: CPT | Performed by: MIDWIFE

## 2023-10-09 NOTE — PROGRESS NOTES
Nausea. Using zofran with fairly good results. Does not want to use unison and vit B6 because it makes her too drowsy. Has 1 girl and then three boys. Hoping for a girl.     CAROL 4 weeks

## 2023-10-10 LAB
ABO + RH BLD: NORMAL
BLOOD BANK CMNT PATIENT-IMP: NORMAL
BLOOD GROUP ANTIBODIES SERPL: NORMAL
ERYTHROCYTE [DISTWIDTH] IN BLOOD BY AUTOMATED COUNT: 13.6 % (ref 11.5–14.5)
HBV SURFACE AG SER QL: <0.1 INDEX
HBV SURFACE AG SER QL: NEGATIVE
HCT VFR BLD AUTO: 35.9 % (ref 35–47)
HCV AB SER IA-ACNC: 0.08 INDEX
HCV AB SERPL QL IA: NONREACTIVE
HGB BLD-MCNC: 11.3 G/DL (ref 11.5–16)
HIV 1+2 AB+HIV1 P24 AG SERPL QL IA: NONREACTIVE
HIV 1/2 RESULT COMMENT: NORMAL
MCH RBC QN AUTO: 27.8 PG (ref 26–34)
MCHC RBC AUTO-ENTMCNC: 31.5 G/DL (ref 30–36.5)
MCV RBC AUTO: 88.2 FL (ref 80–99)
NRBC # BLD: 0 K/UL (ref 0–0.01)
NRBC BLD-RTO: 0 PER 100 WBC
PLATELET # BLD AUTO: 290 K/UL (ref 150–400)
PMV BLD AUTO: 11 FL (ref 8.9–12.9)
RBC # BLD AUTO: 4.07 M/UL (ref 3.8–5.2)
RUBV IGG SERPL IA-ACNC: NORMAL IU/ML
SPECIMEN EXP DATE BLD: NORMAL
WBC # BLD AUTO: 8.2 K/UL (ref 3.6–11)

## 2023-10-11 LAB
T PALLIDUM AB SER QL IA: NON REACTIVE
VZV IGG SER IA-ACNC: 221 INDEX

## 2023-10-12 LAB
HGB A MFR BLD: 97.2 % (ref 96.4–98.8)
HGB A2 MFR BLD COLUMN CHROM: 2.8 % (ref 1.8–3.2)
HGB F MFR BLD: 0 % (ref 0–2)
HGB FRACT BLD-IMP: NORMAL
HGB S MFR BLD: 0 %

## 2023-11-13 ENCOUNTER — ROUTINE PRENATAL (OUTPATIENT)
Age: 28
End: 2023-11-13

## 2023-11-13 VITALS — DIASTOLIC BLOOD PRESSURE: 82 MMHG | WEIGHT: 213 LBS | SYSTOLIC BLOOD PRESSURE: 120 MMHG | BODY MASS INDEX: 36.56 KG/M2

## 2023-11-13 DIAGNOSIS — Z3A.09 9 WEEKS GESTATION OF PREGNANCY: ICD-10-CM

## 2023-11-13 DIAGNOSIS — Z3A.15 15 WEEKS GESTATION OF PREGNANCY: ICD-10-CM

## 2023-11-13 DIAGNOSIS — Z34.82 PRENATAL CARE, SUBSEQUENT PREGNANCY IN SECOND TRIMESTER: Primary | ICD-10-CM

## 2023-11-13 PROCEDURE — 0502F SUBSEQUENT PRENATAL CARE: CPT

## 2023-11-13 NOTE — PROGRESS NOTES
CAROL at 15w3d. Doing well. FM+, Denies LOF/VB/cxns. Some n/v continues, coping okay. RTO 4 wks with US.     Lazarus Deter, SOURAV - CNM

## 2023-12-11 SDOH — ECONOMIC STABILITY: HOUSING INSECURITY
IN THE LAST 12 MONTHS, WAS THERE A TIME WHEN YOU DID NOT HAVE A STEADY PLACE TO SLEEP OR SLEPT IN A SHELTER (INCLUDING NOW)?: NO

## 2023-12-11 SDOH — ECONOMIC STABILITY: INCOME INSECURITY: HOW HARD IS IT FOR YOU TO PAY FOR THE VERY BASICS LIKE FOOD, HOUSING, MEDICAL CARE, AND HEATING?: PATIENT DECLINED

## 2023-12-11 SDOH — ECONOMIC STABILITY: TRANSPORTATION INSECURITY
IN THE PAST 12 MONTHS, HAS LACK OF TRANSPORTATION KEPT YOU FROM MEETINGS, WORK, OR FROM GETTING THINGS NEEDED FOR DAILY LIVING?: NO

## 2023-12-11 SDOH — ECONOMIC STABILITY: FOOD INSECURITY: WITHIN THE PAST 12 MONTHS, YOU WORRIED THAT YOUR FOOD WOULD RUN OUT BEFORE YOU GOT MONEY TO BUY MORE.: PATIENT DECLINED

## 2023-12-11 SDOH — ECONOMIC STABILITY: FOOD INSECURITY: WITHIN THE PAST 12 MONTHS, THE FOOD YOU BOUGHT JUST DIDN'T LAST AND YOU DIDN'T HAVE MONEY TO GET MORE.: NEVER TRUE

## 2023-12-14 ENCOUNTER — ROUTINE PRENATAL (OUTPATIENT)
Age: 28
End: 2023-12-14

## 2023-12-14 VITALS — BODY MASS INDEX: 36.42 KG/M2 | SYSTOLIC BLOOD PRESSURE: 118 MMHG | WEIGHT: 212.2 LBS | DIASTOLIC BLOOD PRESSURE: 78 MMHG

## 2023-12-14 DIAGNOSIS — Z34.82 PRENATAL CARE, SUBSEQUENT PREGNANCY IN SECOND TRIMESTER: Primary | ICD-10-CM

## 2023-12-14 DIAGNOSIS — Z3A.19 19 WEEKS GESTATION OF PREGNANCY: ICD-10-CM

## 2023-12-14 NOTE — PROGRESS NOTES
OB Visit:    U/S today. Feeling fetal movement. Continues to have intermittent nausea. Reviewed scan report- pt doing well over all   CAROL 4 weeks    A SINGLE VIABLE IUP AT 19W6D IS SEEN. FETAL CARDIAC MOTION OBSERVED. FETAL ANATOMY WAS WELL VISUALIZED AND APPEARS WNL. NO ABNORMALITIES WERE SEEN ON TODAYS EXAM. APPROPRIATE GROWTH MEASURED. SIZE = DATES. MATTEO, PLACENTA AND CERVIX APPEAR WITHIN NORMAL LIMITS.  GENDER: FEMALE

## 2024-01-08 ENCOUNTER — ROUTINE PRENATAL (OUTPATIENT)
Age: 29
End: 2024-01-08

## 2024-01-08 VITALS — DIASTOLIC BLOOD PRESSURE: 82 MMHG | BODY MASS INDEX: 37.08 KG/M2 | WEIGHT: 216 LBS | SYSTOLIC BLOOD PRESSURE: 120 MMHG

## 2024-01-08 DIAGNOSIS — Z34.82 PRENATAL CARE, SUBSEQUENT PREGNANCY IN SECOND TRIMESTER: Primary | ICD-10-CM

## 2024-01-08 DIAGNOSIS — Z3A.23 23 WEEKS GESTATION OF PREGNANCY: ICD-10-CM

## 2024-02-05 ENCOUNTER — ROUTINE PRENATAL (OUTPATIENT)
Age: 29
End: 2024-02-05

## 2024-02-05 VITALS — SYSTOLIC BLOOD PRESSURE: 124 MMHG | BODY MASS INDEX: 37.93 KG/M2 | DIASTOLIC BLOOD PRESSURE: 78 MMHG | WEIGHT: 221 LBS

## 2024-02-05 DIAGNOSIS — Z3A.09 9 WEEKS GESTATION OF PREGNANCY: ICD-10-CM

## 2024-02-05 DIAGNOSIS — Z34.83 PRENATAL CARE, SUBSEQUENT PREGNANCY IN THIRD TRIMESTER: Primary | ICD-10-CM

## 2024-02-05 LAB
ABO + RH BLD: NORMAL
BLOOD BANK CMNT PATIENT-IMP: NORMAL
BLOOD GROUP ANTIBODIES SERPL: NORMAL
ERYTHROCYTE [DISTWIDTH] IN BLOOD BY AUTOMATED COUNT: 15 % (ref 11.5–14.5)
GLUCOSE 1H P 100 G GLC PO SERPL-MCNC: 144 MG/DL (ref 65–140)
HCT VFR BLD AUTO: 30.6 % (ref 35–47)
HGB BLD-MCNC: 9.9 G/DL (ref 11.5–16)
HIV 1+2 AB+HIV1 P24 AG SERPL QL IA: NONREACTIVE
HIV 1/2 RESULT COMMENT: NORMAL
MCH RBC QN AUTO: 26.9 PG (ref 26–34)
MCHC RBC AUTO-ENTMCNC: 32.4 G/DL (ref 30–36.5)
MCV RBC AUTO: 83.2 FL (ref 80–99)
NRBC # BLD: 0 K/UL (ref 0–0.01)
NRBC BLD-RTO: 0 PER 100 WBC
PLATELET # BLD AUTO: 274 K/UL (ref 150–400)
PMV BLD AUTO: 11.3 FL (ref 8.9–12.9)
RBC # BLD AUTO: 3.68 M/UL (ref 3.8–5.2)
SPECIMEN EXP DATE BLD: NORMAL
WBC # BLD AUTO: 7.4 K/UL (ref 3.6–11)

## 2024-02-05 PROCEDURE — 0502F SUBSEQUENT PRENATAL CARE: CPT | Performed by: MIDWIFE

## 2024-02-05 NOTE — PROGRESS NOTES
+FM Doing well. Glucola and 3T labs today. Declined Tdap.  Still has nausea/gagging occasionally.    Reviewed breastfeeding and 3rd trimester packet.  Size greater than dates.  US next visit.  CAROL 2-3 weeks.

## 2024-02-06 LAB — T PALLIDUM AB SER QL IA: NON REACTIVE

## 2024-02-07 RX ORDER — FERROUS SULFATE 325(65) MG
325 TABLET ORAL 2 TIMES DAILY
Qty: 180 TABLET | Refills: 1 | Status: SHIPPED | OUTPATIENT
Start: 2024-02-07

## 2024-02-12 ENCOUNTER — NURSE ONLY (OUTPATIENT)
Age: 29
End: 2024-02-12

## 2024-02-12 DIAGNOSIS — Z34.83 PRENATAL CARE, SUBSEQUENT PREGNANCY IN THIRD TRIMESTER: Primary | ICD-10-CM

## 2024-02-13 LAB
GESTATIONAL 3HR GTT: NORMAL
GLUCOSE 1H P 100 G GLC PO SERPL-MCNC: 128 MG/DL (ref 65–180)
GLUCOSE 2 HOUR: 94 MG/DL (ref 65–155)
GLUCOSE P FAST SERPL-MCNC: 83 MG/DL (ref 65–95)
GLUCOSE, 3 HOUR: 102 MG/DL (ref 65–140)

## 2024-02-26 ENCOUNTER — ROUTINE PRENATAL (OUTPATIENT)
Age: 29
End: 2024-02-26

## 2024-02-26 VITALS — WEIGHT: 220 LBS | SYSTOLIC BLOOD PRESSURE: 122 MMHG | BODY MASS INDEX: 37.76 KG/M2 | DIASTOLIC BLOOD PRESSURE: 80 MMHG

## 2024-02-26 DIAGNOSIS — Z3A.09 9 WEEKS GESTATION OF PREGNANCY: Primary | ICD-10-CM

## 2024-02-26 PROCEDURE — 0502F SUBSEQUENT PRENATAL CARE: CPT | Performed by: ADVANCED PRACTICE MIDWIFE

## 2024-02-26 NOTE — PROGRESS NOTES
+FM Pt doing well with no complaints, general aches and pains,   Reviewed US tech report.   FH 35 cm today with 58% EFW, MATTEO 25.9 cm    CAROL 2 weeks  CAROL 4 weeks with repeat US for MATTEO    US:  LIMITED OB SCAN A SINGLE VERTEX 30W3D IUP IS SEEN. FETAL CARDIAC MOTION OBSERVED. LIMITED ANATOMY WAS VISUALIZED AND APPEARS WNL. EFW= 3 LB 11OZ ( 58 %) MATTEO= 25.7 CM PLACENTA APPEARS WITHIN NORMAL LIMITS.

## 2024-03-11 ENCOUNTER — ROUTINE PRENATAL (OUTPATIENT)
Age: 29
End: 2024-03-11

## 2024-03-11 VITALS — DIASTOLIC BLOOD PRESSURE: 82 MMHG | BODY MASS INDEX: 38.11 KG/M2 | SYSTOLIC BLOOD PRESSURE: 120 MMHG | WEIGHT: 222 LBS

## 2024-03-11 DIAGNOSIS — Z34.83 PRENATAL CARE, SUBSEQUENT PREGNANCY IN THIRD TRIMESTER: Primary | ICD-10-CM

## 2024-03-11 NOTE — PROGRESS NOTES
+FM Pt reports no medical complaints.   GFM  Denies regular UC's or PTL s/sx  FH still measuring elevated @ 36, has sono scheduled for 2 weeks

## 2024-03-25 ENCOUNTER — ROUTINE PRENATAL (OUTPATIENT)
Age: 29
End: 2024-03-25

## 2024-03-25 VITALS — DIASTOLIC BLOOD PRESSURE: 84 MMHG | BODY MASS INDEX: 37.76 KG/M2 | WEIGHT: 220 LBS | SYSTOLIC BLOOD PRESSURE: 128 MMHG

## 2024-03-25 DIAGNOSIS — Z3A.34 34 WEEKS GESTATION OF PREGNANCY: Primary | ICD-10-CM

## 2024-03-25 DIAGNOSIS — Z3A.09 9 WEEKS GESTATION OF PREGNANCY: ICD-10-CM

## 2024-03-25 PROCEDURE — 0502F SUBSEQUENT PRENATAL CARE: CPT | Performed by: ADVANCED PRACTICE MIDWIFE

## 2024-03-25 NOTE — PROGRESS NOTES
LIMITED OB SCAN A SINGLE VERTEX 34W3D IUP IS SEEN. FETAL CARDIAC MOTION OBSERVED. LIMITED ANATOMY WAS VISUALIZED AND APPEARS WNL. EFW= 6 LB 9 OZ ( 80.9 %) MATTEO= 16.64 CM PLACENTA APPEARS WITHIN NORMAL LIMITS.     Reviewed growth with pt- she has a hx of LGA babies with large heads-   She would like to schedule and IOL for  purposes and LGA baby- message sent to Brittney for 39 weeks    CAROL 2 weeks with GBS

## 2024-04-08 ENCOUNTER — ROUTINE PRENATAL (OUTPATIENT)
Age: 29
End: 2024-04-08

## 2024-04-08 DIAGNOSIS — Z3A.36 36 WEEKS GESTATION OF PREGNANCY: Primary | ICD-10-CM

## 2024-04-10 LAB — GBS, EXTERNAL RESULT: NEGATIVE

## 2024-04-15 ENCOUNTER — ROUTINE PRENATAL (OUTPATIENT)
Age: 29
End: 2024-04-15
Payer: COMMERCIAL

## 2024-04-15 VITALS — SYSTOLIC BLOOD PRESSURE: 124 MMHG | WEIGHT: 224 LBS | BODY MASS INDEX: 38.45 KG/M2 | DIASTOLIC BLOOD PRESSURE: 62 MMHG

## 2024-04-15 DIAGNOSIS — Z3A.37 37 WEEKS GESTATION OF PREGNANCY: Primary | ICD-10-CM

## 2024-04-15 PROCEDURE — 0502F SUBSEQUENT PRENATAL CARE: CPT

## 2024-04-15 PROCEDURE — 59025 FETAL NON-STRESS TEST: CPT

## 2024-04-15 NOTE — PROGRESS NOTES
CAROL at 37w3d.  Doing well. FM+, Denies LOF/VB. Occ cxns.  GBS swab today.   FHR 170s-of note, pt has been sick. NST todays finds tachycardia in 170s. Pt hydrated with three glasses of water-monitored for 35 mins. FHR down to 150s-160s with moderate varibility accels noted. Cat I reactive.  RTO 1 wk    Piero Scherer, SOURAV - SHADEM

## 2024-04-15 NOTE — PROGRESS NOTES
+FM Pt c/o bilateral foot swelling. GBS today.    NST procedure note    Judi Diehl is a ,  28 y.o. female White (non-) whose Patient's last menstrual period was 2023 (exact date).  was on  who presents for fetal non-stress test.    She is 37w3d and was monitored for 35 minutes and the FHR was reactive.    NST Interpretation:    FHR baseline 160 bpm, variability moderate, accelerations present, decelerations Absent.    Uterine contractions were presentx1 for 60 seconds.     Judi was informed of the NST results and her questions were answered.

## 2024-04-18 LAB
GP B STREP DNA SPEC QL NAA+PROBE: NEGATIVE
SPECIMEN SOURCE: NORMAL

## 2024-04-24 ENCOUNTER — HOSPITAL ENCOUNTER (INPATIENT)
Facility: HOSPITAL | Age: 29
LOS: 2 days | Discharge: HOME | End: 2024-04-26
Attending: ADVANCED PRACTICE MIDWIFE | Admitting: OBSTETRICS & GYNECOLOGY
Payer: COMMERCIAL

## 2024-04-24 ENCOUNTER — ROUTINE PRENATAL (OUTPATIENT)
Age: 29
End: 2024-04-24

## 2024-04-24 ENCOUNTER — ANESTHESIA (OUTPATIENT)
Facility: HOSPITAL | Age: 29
End: 2024-04-24
Payer: COMMERCIAL

## 2024-04-24 ENCOUNTER — ANESTHESIA EVENT (OUTPATIENT)
Facility: HOSPITAL | Age: 29
End: 2024-04-24
Payer: COMMERCIAL

## 2024-04-24 VITALS — DIASTOLIC BLOOD PRESSURE: 80 MMHG | WEIGHT: 224 LBS | BODY MASS INDEX: 38.45 KG/M2 | SYSTOLIC BLOOD PRESSURE: 122 MMHG

## 2024-04-24 DIAGNOSIS — Z3A.38 38 WEEKS GESTATION OF PREGNANCY: Primary | ICD-10-CM

## 2024-04-24 PROBLEM — O42.90 PROM (PREMATURE RUPTURE OF MEMBRANES): Status: ACTIVE | Noted: 2024-04-24

## 2024-04-24 LAB
ABO + RH BLD: NORMAL
BASOPHILS # BLD: 0 K/UL (ref 0–0.1)
BASOPHILS NFR BLD: 0 % (ref 0–1)
BLOOD GROUP ANTIBODIES SERPL: NORMAL
DIFFERENTIAL METHOD BLD: ABNORMAL
EOSINOPHIL # BLD: 0 K/UL (ref 0–0.4)
EOSINOPHIL NFR BLD: 0 % (ref 0–7)
ERYTHROCYTE [DISTWIDTH] IN BLOOD BY AUTOMATED COUNT: 17.6 % (ref 11.5–14.5)
HCT VFR BLD AUTO: 28.1 % (ref 35–47)
HGB BLD-MCNC: 8.4 G/DL (ref 11.5–16)
IMM GRANULOCYTES # BLD AUTO: 0 K/UL (ref 0–0.04)
IMM GRANULOCYTES NFR BLD AUTO: 0 % (ref 0–0.5)
LYMPHOCYTES # BLD: 1.2 K/UL (ref 0.8–3.5)
LYMPHOCYTES NFR BLD: 17 % (ref 12–49)
MCH RBC QN AUTO: 23.2 PG (ref 26–34)
MCHC RBC AUTO-ENTMCNC: 29.9 G/DL (ref 30–36.5)
MCV RBC AUTO: 77.6 FL (ref 80–99)
MONOCYTES # BLD: 0.4 K/UL (ref 0–1)
MONOCYTES NFR BLD: 6 % (ref 5–13)
NEUTS SEG # BLD: 5.1 K/UL (ref 1.8–8)
NEUTS SEG NFR BLD: 77 % (ref 32–75)
NRBC # BLD: 0 K/UL (ref 0–0.01)
NRBC BLD-RTO: 0 PER 100 WBC
PLATELET # BLD AUTO: 208 K/UL (ref 150–400)
PMV BLD AUTO: 11.2 FL (ref 8.9–12.9)
RBC # BLD AUTO: 3.62 M/UL (ref 3.8–5.2)
RPR SER QL: NONREACTIVE
SPECIMEN EXP DATE BLD: NORMAL
WBC # BLD AUTO: 6.7 K/UL (ref 3.6–11)

## 2024-04-24 PROCEDURE — 7220000101 HC DELIVERY VAGINAL/SINGLE

## 2024-04-24 PROCEDURE — 00HU33Z INSERTION OF INFUSION DEVICE INTO SPINAL CANAL, PERCUTANEOUS APPROACH: ICD-10-PCS | Performed by: ADVANCED PRACTICE MIDWIFE

## 2024-04-24 PROCEDURE — 2500000003 HC RX 250 WO HCPCS: Performed by: ANESTHESIOLOGY

## 2024-04-24 PROCEDURE — 3700000156 HC EPIDURAL ANESTHESIA

## 2024-04-24 PROCEDURE — 2580000003 HC RX 258: Performed by: ADVANCED PRACTICE MIDWIFE

## 2024-04-24 PROCEDURE — 6370000000 HC RX 637 (ALT 250 FOR IP)

## 2024-04-24 PROCEDURE — 51701 INSERT BLADDER CATHETER: CPT

## 2024-04-24 PROCEDURE — 36415 COLL VENOUS BLD VENIPUNCTURE: CPT

## 2024-04-24 PROCEDURE — 0502F SUBSEQUENT PRENATAL CARE: CPT | Performed by: ADVANCED PRACTICE MIDWIFE

## 2024-04-24 PROCEDURE — 86900 BLOOD TYPING SEROLOGIC ABO: CPT

## 2024-04-24 PROCEDURE — 7210000100 HC LABOR FEE PER 1 HR

## 2024-04-24 PROCEDURE — 3700000025 EPIDURAL BLOCK: Performed by: ANESTHESIOLOGY

## 2024-04-24 PROCEDURE — 6360000002 HC RX W HCPCS: Performed by: ADVANCED PRACTICE MIDWIFE

## 2024-04-24 PROCEDURE — 86850 RBC ANTIBODY SCREEN: CPT

## 2024-04-24 PROCEDURE — 1120000000 HC RM PRIVATE OB

## 2024-04-24 PROCEDURE — 59400 OBSTETRICAL CARE: CPT | Performed by: ADVANCED PRACTICE MIDWIFE

## 2024-04-24 PROCEDURE — 7100000000 HC PACU RECOVERY - FIRST 15 MIN

## 2024-04-24 PROCEDURE — 86901 BLOOD TYPING SEROLOGIC RH(D): CPT

## 2024-04-24 PROCEDURE — 7100000001 HC PACU RECOVERY - ADDTL 15 MIN

## 2024-04-24 PROCEDURE — 6360000002 HC RX W HCPCS: Performed by: ANESTHESIOLOGY

## 2024-04-24 PROCEDURE — 85025 COMPLETE CBC W/AUTO DIFF WBC: CPT

## 2024-04-24 PROCEDURE — 86592 SYPHILIS TEST NON-TREP QUAL: CPT

## 2024-04-24 RX ORDER — MODIFIED LANOLIN
OINTMENT (GRAM) TOPICAL PRN
Status: DISCONTINUED | OUTPATIENT
Start: 2024-04-24 | End: 2024-04-26 | Stop reason: HOSPADM

## 2024-04-24 RX ORDER — ACETAMINOPHEN 500 MG
1000 TABLET ORAL EVERY 8 HOURS SCHEDULED
Status: DISCONTINUED | OUTPATIENT
Start: 2024-04-24 | End: 2024-04-24

## 2024-04-24 RX ORDER — DIPHENHYDRAMINE HCL 25 MG
25 CAPSULE ORAL EVERY 4 HOURS PRN
Status: DISCONTINUED | OUTPATIENT
Start: 2024-04-24 | End: 2024-04-24

## 2024-04-24 RX ORDER — ONDANSETRON 2 MG/ML
4 INJECTION INTRAMUSCULAR; INTRAVENOUS EVERY 6 HOURS PRN
Status: DISCONTINUED | OUTPATIENT
Start: 2024-04-24 | End: 2024-04-24

## 2024-04-24 RX ORDER — MISOPROSTOL 200 UG/1
400 TABLET ORAL PRN
Status: DISCONTINUED | OUTPATIENT
Start: 2024-04-24 | End: 2024-04-24

## 2024-04-24 RX ORDER — SODIUM CHLORIDE, SODIUM LACTATE, POTASSIUM CHLORIDE, AND CALCIUM CHLORIDE .6; .31; .03; .02 G/100ML; G/100ML; G/100ML; G/100ML
500 INJECTION, SOLUTION INTRAVENOUS PRN
Status: DISCONTINUED | OUTPATIENT
Start: 2024-04-24 | End: 2024-04-24

## 2024-04-24 RX ORDER — LIDOCAINE HCL/EPINEPHRINE/PF 2%-1:200K
VIAL (ML) INJECTION
Status: COMPLETED
Start: 2024-04-24 | End: 2024-04-24

## 2024-04-24 RX ORDER — SODIUM CHLORIDE 0.9 % (FLUSH) 0.9 %
5-40 SYRINGE (ML) INJECTION PRN
Status: DISCONTINUED | OUTPATIENT
Start: 2024-04-24 | End: 2024-04-26 | Stop reason: HOSPADM

## 2024-04-24 RX ORDER — ACETAMINOPHEN 500 MG
1000 TABLET ORAL EVERY 8 HOURS SCHEDULED
Status: DISCONTINUED | OUTPATIENT
Start: 2024-04-24 | End: 2024-04-26 | Stop reason: HOSPADM

## 2024-04-24 RX ORDER — OXYCODONE HYDROCHLORIDE 5 MG/1
5 TABLET ORAL EVERY 4 HOURS PRN
Status: DISCONTINUED | OUTPATIENT
Start: 2024-04-24 | End: 2024-04-26 | Stop reason: HOSPADM

## 2024-04-24 RX ORDER — FENTANYL/BUPIVACAINE/NS/PF 2-1250MCG
10 PLASTIC BAG, INJECTION (ML) INJECTION CONTINUOUS
Status: DISCONTINUED | OUTPATIENT
Start: 2024-04-24 | End: 2024-04-24

## 2024-04-24 RX ORDER — SODIUM CHLORIDE 0.9 % (FLUSH) 0.9 %
5-40 SYRINGE (ML) INJECTION EVERY 12 HOURS SCHEDULED
Status: DISCONTINUED | OUTPATIENT
Start: 2024-04-24 | End: 2024-04-26 | Stop reason: HOSPADM

## 2024-04-24 RX ORDER — SODIUM CHLORIDE 0.9 % (FLUSH) 0.9 %
5-40 SYRINGE (ML) INJECTION EVERY 12 HOURS SCHEDULED
Status: DISCONTINUED | OUTPATIENT
Start: 2024-04-24 | End: 2024-04-24

## 2024-04-24 RX ORDER — SEVOFLURANE 250 ML/250ML
1 LIQUID RESPIRATORY (INHALATION) CONTINUOUS PRN
Status: DISCONTINUED | OUTPATIENT
Start: 2024-04-24 | End: 2024-04-24

## 2024-04-24 RX ORDER — IBUPROFEN 400 MG/1
800 TABLET ORAL EVERY 8 HOURS SCHEDULED
Status: DISCONTINUED | OUTPATIENT
Start: 2024-04-24 | End: 2024-04-24

## 2024-04-24 RX ORDER — DOCUSATE SODIUM 100 MG/1
100 CAPSULE, LIQUID FILLED ORAL 2 TIMES DAILY
Status: DISCONTINUED | OUTPATIENT
Start: 2024-04-24 | End: 2024-04-24

## 2024-04-24 RX ORDER — SODIUM CHLORIDE, SODIUM LACTATE, POTASSIUM CHLORIDE, CALCIUM CHLORIDE 600; 310; 30; 20 MG/100ML; MG/100ML; MG/100ML; MG/100ML
INJECTION, SOLUTION INTRAVENOUS CONTINUOUS
Status: DISCONTINUED | OUTPATIENT
Start: 2024-04-24 | End: 2024-04-24

## 2024-04-24 RX ORDER — SODIUM CHLORIDE, SODIUM LACTATE, POTASSIUM CHLORIDE, AND CALCIUM CHLORIDE .6; .31; .03; .02 G/100ML; G/100ML; G/100ML; G/100ML
1000 INJECTION, SOLUTION INTRAVENOUS PRN
Status: DISCONTINUED | OUTPATIENT
Start: 2024-04-24 | End: 2024-04-24

## 2024-04-24 RX ORDER — OXYCODONE HYDROCHLORIDE 5 MG/1
10 TABLET ORAL EVERY 4 HOURS PRN
Status: DISCONTINUED | OUTPATIENT
Start: 2024-04-24 | End: 2024-04-26 | Stop reason: HOSPADM

## 2024-04-24 RX ORDER — METHYLERGONOVINE MALEATE 0.2 MG/ML
200 INJECTION INTRAVENOUS PRN
Status: DISCONTINUED | OUTPATIENT
Start: 2024-04-24 | End: 2024-04-24

## 2024-04-24 RX ORDER — EPHEDRINE SULFATE 50 MG/ML
10 INJECTION INTRAVENOUS ONCE
Status: DISCONTINUED | OUTPATIENT
Start: 2024-04-24 | End: 2024-04-24

## 2024-04-24 RX ORDER — IBUPROFEN 400 MG/1
800 TABLET ORAL EVERY 8 HOURS SCHEDULED
Status: DISCONTINUED | OUTPATIENT
Start: 2024-04-24 | End: 2024-04-26 | Stop reason: HOSPADM

## 2024-04-24 RX ORDER — SODIUM CHLORIDE, SODIUM LACTATE, POTASSIUM CHLORIDE, CALCIUM CHLORIDE 600; 310; 30; 20 MG/100ML; MG/100ML; MG/100ML; MG/100ML
INJECTION, SOLUTION INTRAVENOUS CONTINUOUS
Status: DISCONTINUED | OUTPATIENT
Start: 2024-04-24 | End: 2024-04-26 | Stop reason: HOSPADM

## 2024-04-24 RX ORDER — TERBUTALINE SULFATE 1 MG/ML
0.25 INJECTION, SOLUTION SUBCUTANEOUS
Status: DISCONTINUED | OUTPATIENT
Start: 2024-04-24 | End: 2024-04-24

## 2024-04-24 RX ORDER — NALOXONE HYDROCHLORIDE 0.4 MG/ML
INJECTION, SOLUTION INTRAMUSCULAR; INTRAVENOUS; SUBCUTANEOUS PRN
Status: DISCONTINUED | OUTPATIENT
Start: 2024-04-24 | End: 2024-04-24

## 2024-04-24 RX ORDER — LIDOCAINE HCL/EPINEPHRINE/PF 2%-1:200K
VIAL (ML) INJECTION PRN
Status: DISCONTINUED | OUTPATIENT
Start: 2024-04-24 | End: 2024-04-24 | Stop reason: SDUPTHER

## 2024-04-24 RX ORDER — CARBOPROST TROMETHAMINE 250 UG/ML
250 INJECTION, SOLUTION INTRAMUSCULAR PRN
Status: DISCONTINUED | OUTPATIENT
Start: 2024-04-24 | End: 2024-04-24

## 2024-04-24 RX ORDER — BUPIVACAINE HYDROCHLORIDE 2.5 MG/ML
INJECTION, SOLUTION EPIDURAL; INFILTRATION; INTRACAUDAL
Status: COMPLETED
Start: 2024-04-24 | End: 2024-04-24

## 2024-04-24 RX ORDER — FENTANYL CITRATE 50 UG/ML
INJECTION, SOLUTION INTRAMUSCULAR; INTRAVENOUS PRN
Status: DISCONTINUED | OUTPATIENT
Start: 2024-04-24 | End: 2024-04-24 | Stop reason: SDUPTHER

## 2024-04-24 RX ORDER — SODIUM CHLORIDE 9 MG/ML
INJECTION, SOLUTION INTRAVENOUS PRN
Status: DISCONTINUED | OUTPATIENT
Start: 2024-04-24 | End: 2024-04-24

## 2024-04-24 RX ORDER — SODIUM CHLORIDE 9 MG/ML
INJECTION, SOLUTION INTRAVENOUS PRN
Status: DISCONTINUED | OUTPATIENT
Start: 2024-04-24 | End: 2024-04-26 | Stop reason: HOSPADM

## 2024-04-24 RX ORDER — SODIUM CHLORIDE 0.9 % (FLUSH) 0.9 %
5-40 SYRINGE (ML) INJECTION PRN
Status: DISCONTINUED | OUTPATIENT
Start: 2024-04-24 | End: 2024-04-24

## 2024-04-24 RX ORDER — DOCUSATE SODIUM 100 MG/1
100 CAPSULE, LIQUID FILLED ORAL 2 TIMES DAILY
Status: DISCONTINUED | OUTPATIENT
Start: 2024-04-24 | End: 2024-04-26 | Stop reason: HOSPADM

## 2024-04-24 RX ORDER — BUPIVACAINE HYDROCHLORIDE 2.5 MG/ML
INJECTION, SOLUTION EPIDURAL; INFILTRATION; INTRACAUDAL PRN
Status: DISCONTINUED | OUTPATIENT
Start: 2024-04-24 | End: 2024-04-24 | Stop reason: SDUPTHER

## 2024-04-24 RX ORDER — FENTANYL CITRATE 50 UG/ML
100 INJECTION, SOLUTION INTRAMUSCULAR; INTRAVENOUS
Status: DISCONTINUED | OUTPATIENT
Start: 2024-04-24 | End: 2024-04-24

## 2024-04-24 RX ORDER — ONDANSETRON 4 MG/1
4 TABLET, ORALLY DISINTEGRATING ORAL EVERY 6 HOURS PRN
Status: DISCONTINUED | OUTPATIENT
Start: 2024-04-24 | End: 2024-04-24

## 2024-04-24 RX ORDER — LIDOCAINE HYDROCHLORIDE 10 MG/ML
30 INJECTION, SOLUTION EPIDURAL; INFILTRATION; INTRACAUDAL; PERINEURAL PRN
Status: DISCONTINUED | OUTPATIENT
Start: 2024-04-24 | End: 2024-04-24

## 2024-04-24 RX ADMIN — OXYTOCIN 1 MILLI-UNITS/MIN: 10 INJECTION INTRAVENOUS at 10:46

## 2024-04-24 RX ADMIN — Medication 10 ML/HR: at 14:00

## 2024-04-24 RX ADMIN — IBUPROFEN 800 MG: 400 TABLET, FILM COATED ORAL at 21:43

## 2024-04-24 RX ADMIN — BUPIVACAINE HYDROCHLORIDE 5 ML: 2.5 INJECTION, SOLUTION EPIDURAL; INFILTRATION; INTRACAUDAL; PERINEURAL at 13:50

## 2024-04-24 RX ADMIN — SODIUM CHLORIDE, POTASSIUM CHLORIDE, SODIUM LACTATE AND CALCIUM CHLORIDE: 600; 310; 30; 20 INJECTION, SOLUTION INTRAVENOUS at 10:17

## 2024-04-24 RX ADMIN — LIDOCAINE HYDROCHLORIDE AND EPINEPHRINE 5 ML: 20; 5 INJECTION, SOLUTION EPIDURAL; INFILTRATION; INTRACAUDAL; PERINEURAL at 13:45

## 2024-04-24 RX ADMIN — FENTANYL CITRATE 100 MCG: 50 INJECTION, SOLUTION INTRAMUSCULAR; INTRAVENOUS at 13:47

## 2024-04-24 RX ADMIN — Medication 166.7 ML: at 17:35

## 2024-04-24 RX ADMIN — OXYTOCIN 87.3 MILLI-UNITS/MIN: 10 INJECTION INTRAVENOUS at 17:50

## 2024-04-24 ASSESSMENT — PAIN DESCRIPTION - ORIENTATION: ORIENTATION: LOWER

## 2024-04-24 ASSESSMENT — PAIN SCALES - GENERAL: PAINLEVEL_OUTOF10: 4

## 2024-04-24 ASSESSMENT — PAIN DESCRIPTION - LOCATION: LOCATION: ABDOMEN

## 2024-04-24 ASSESSMENT — PAIN - FUNCTIONAL ASSESSMENT: PAIN_FUNCTIONAL_ASSESSMENT: ACTIVITIES ARE NOT PREVENTED

## 2024-04-24 ASSESSMENT — PAIN DESCRIPTION - DESCRIPTORS: DESCRIPTORS: CRAMPING

## 2024-04-24 NOTE — PROGRESS NOTES
Pt arrived with C/O LOF approx 650 am this morning called the on call CNM  Was told to come to the office   Nitrazine positive   Exam 4/50/-2 head well applied  Large gush of  clear fluid with exam   L&D called pt to go over   Desires augmentation and epidural     EV BARTHOLOMEW, SOURAV - SOILA'

## 2024-04-24 NOTE — ANESTHESIA POSTPROCEDURE EVALUATION
Department of Anesthesiology  Postprocedure Note    Patient: Judi Diehl  MRN: [id number]  YOB: 1995  Date of evaluation: 4/24/2024    Procedure Summary       Date: 04/24/24 Room / Location:     Anesthesia Start: 1340 Anesthesia Stop: 1728    Procedure: Labor Analgesia Diagnosis:     Scheduled Providers:  Responsible Provider: Henrry Palomo MD    Anesthesia Type: epidural ASA Status: 2            Anesthesia Type: No value filed.    Juliana Phase I:      Juliana Phase II:      Anesthesia Post Evaluation  Epidural D/c'd    No notable events documented.

## 2024-04-24 NOTE — ANESTHESIA PROCEDURE NOTES
Epidural Block    Patient location during procedure: OB  Reason for block: labor epidural  Staffing  Performed: anesthesiologist   Performed by: Henrry Palomo MD  Authorized by: Henrry Palomo MD    Epidural  Patient position: sitting  Prep: DuraPrep  Patient monitoring: continuous pulse ox and frequent blood pressure checks  Approach: midline  Location: L2-3  Injection technique: CARLOS ALBERTO air and CARLOS ALBERTO saline  Provider prep: mask and sterile gloves  Needle  Needle type: Tuohy   Needle gauge: 17 G  Needle length: 3.5 in  Needle insertion depth: 6 cm  Catheter type: multi-orifice  Catheter size: 20 G  Catheter at skin depth: 11 cm  Test dose: negativeCatheter Secured: tegaderm and tape  Assessment  Sensory level: T10  Hemodynamics: stable  Attempts: 1  Outcomes: uncomplicated and patient tolerated procedure well  Preanesthetic Checklist  Completed: patient identified, IV checked, site marked, risks and benefits discussed, surgical/procedural consents, equipment checked, pre-op evaluation, timeout performed, anesthesia consent given, oxygen available, monitors applied/VS acknowledged, fire risk safety assessment completed and verbalized and blood product R/B/A discussed and consented

## 2024-04-24 NOTE — L&D DELIVERY NOTE
Became Complete and +2, with strong urge to push. Pushed for 2 contractions.  Live Female infant. Over intact perineum. Infant placed to maternal abdomen. Infant dried and stimulated, spontaneous cry. Cord allowed to cease pulsations, then doubly clamped and cut per sister. 3VC. Cordblood Obtained yes. Placenta and cord, spont jian, Intact. EBL <500 ml .All counts correct yes. To RR, Stable. \"Kaelani \"          ERIK Diehl [[id number]]      Labor Events     Labor: No   Steroids: None  Cervical Ripening Date/Time:      Antibiotics Received during Labor: No  Rupture Date/Time:      Rupture Type: SROM  Fluid Color: Clear, Bloody Show  Induction: None  Augmentation: Oxytocin              Anesthesia    Method: Epidural       Delivery Details      Delivery Date: 24 Delivery Time: 17:28:00                 Cord                  Placenta           Lacerations           Blood Loss  Mother: Judi Diehl R #[id number]     Start of Mother's Information      Delivery Blood Loss  24 0528 - 24 1746      None                 End of Mother's Information  Mother: Judi Diehl R #[id number]                Delivery Providers    Delivering clinician: Kimberly Tavares APRN - CNM     Provider Role     Obstetrician    China Townsend, RN Primary Nurse     Primary  Nurse     NICU Nurse     Neonatologist     Anesthesiologist     Nurse Anesthetist     Nurse Practitioner    Kimberly Tavares APRN - CNM Midwife     Nursery Nurse               Assessment          Skin Color:   Heart Rate:   Reflex Irritability:   Muscle Tone:   Respiratory Effort:   Total:            1 Minute:         5 Minute:                                                  Measurements

## 2024-04-24 NOTE — PROGRESS NOTES
Labor Progress Note  Patient seen, fetal heart rate and contraction pattern evaluated, patient examined.  /66   Pulse 98   Temp 98 °F (36.7 °C)   Resp 16   LMP 07/28/2023 (Exact Date)   SpO2 100%     Physical Exam:  Cervical Exam:  deferred  Membranes:   clear fluid  Uterine Activity: 2-4  Fetal Heart Rate: Cat 1  Pitocin 5 miliunits    Assessment/Plan:  Reassuring fetal status, Continue plan for vaginal delivery    Continue current plan   SOURAV NAYAK - SOILA

## 2024-04-25 PROCEDURE — 6370000000 HC RX 637 (ALT 250 FOR IP)

## 2024-04-25 PROCEDURE — 6370000000 HC RX 637 (ALT 250 FOR IP): Performed by: ADVANCED PRACTICE MIDWIFE

## 2024-04-25 PROCEDURE — 1120000000 HC RM PRIVATE OB

## 2024-04-25 RX ADMIN — IBUPROFEN 800 MG: 400 TABLET, FILM COATED ORAL at 21:05

## 2024-04-25 RX ADMIN — IBUPROFEN 800 MG: 400 TABLET, FILM COATED ORAL at 12:57

## 2024-04-25 RX ADMIN — DOCUSATE SODIUM 100 MG: 100 CAPSULE, LIQUID FILLED ORAL at 21:05

## 2024-04-25 RX ADMIN — ACETAMINOPHEN 1000 MG: 500 TABLET ORAL at 18:08

## 2024-04-25 RX ADMIN — DOCUSATE SODIUM 100 MG: 100 CAPSULE, LIQUID FILLED ORAL at 08:40

## 2024-04-25 RX ADMIN — IBUPROFEN 800 MG: 400 TABLET, FILM COATED ORAL at 05:44

## 2024-04-25 ASSESSMENT — PAIN DESCRIPTION - LOCATION
LOCATION: ABDOMEN

## 2024-04-25 ASSESSMENT — PAIN SCALES - GENERAL
PAINLEVEL_OUTOF10: 4
PAINLEVEL_OUTOF10: 3
PAINLEVEL_OUTOF10: 3

## 2024-04-25 ASSESSMENT — PAIN DESCRIPTION - DESCRIPTORS
DESCRIPTORS: CRAMPING;DISCOMFORT
DESCRIPTORS: ACHING;CRAMPING
DESCRIPTORS: CRAMPING

## 2024-04-25 ASSESSMENT — PAIN - FUNCTIONAL ASSESSMENT
PAIN_FUNCTIONAL_ASSESSMENT: ACTIVITIES ARE NOT PREVENTED

## 2024-04-25 ASSESSMENT — PAIN DESCRIPTION - ORIENTATION
ORIENTATION: LOWER
ORIENTATION: LOWER

## 2024-04-25 NOTE — DISCHARGE INSTRUCTIONS
Postpartum Support Groups  We know that all of us are dealing with a tremendous amount of uncertainty, confusion and disruption to our daily lives, which may result in increased anxiety, depression and fear. If you are feeling unsettled or worse, please know that we are here to help. During this time of increased caution and care for one another, [facility] [place] ([facility]) is offering virtual support groups to ALL MOTHERS in [place] regardless of the age of your child/children as a way to help weather this emotional storm together. Social support is an important part of self-care during this time of physical distancing.  Virtual postpartum support group meetings available at www.postpartumva.org  Warm Line: 677.988.3570    Breastfeeding Support Groups   1st and 3rd Wednesday of each month at [place] [facility]  2nd and 4th Tuesday of each month at [place]      [email or web address]  '

## 2024-04-25 NOTE — LACTATION NOTE
This note was copied from a baby's chart.  Initial Lactation Consultatioin - Baby born vaginally yesterday to a  mom at 38 5/7 weeks gestation. Mom states she breast fed her last 2 children with oversupply of milk. She said this baby has been latching and nursing well. She said baby was eating frequently throughout the night. She said her nipples are sore. I reminded her to get a deep latch each time baby is nursing. She will not limit the time the baby is at the breast and will offer both breasts at each feeding.

## 2024-04-26 VITALS
TEMPERATURE: 98.3 F | SYSTOLIC BLOOD PRESSURE: 122 MMHG | HEART RATE: 76 BPM | DIASTOLIC BLOOD PRESSURE: 70 MMHG | RESPIRATION RATE: 16 BRPM | OXYGEN SATURATION: 100 %

## 2024-04-26 PROCEDURE — 6370000000 HC RX 637 (ALT 250 FOR IP)

## 2024-04-26 RX ORDER — IBUPROFEN 800 MG/1
800 TABLET ORAL EVERY 8 HOURS SCHEDULED
Qty: 120 TABLET | Refills: 3 | Status: SHIPPED | OUTPATIENT
Start: 2024-04-26

## 2024-04-26 RX ORDER — PSEUDOEPHEDRINE HCL 30 MG
100 TABLET ORAL DAILY
Qty: 90 CAPSULE | Refills: 1 | Status: SHIPPED | OUTPATIENT
Start: 2024-04-26

## 2024-04-26 RX ORDER — FERROUS SULFATE 325(65) MG
325 TABLET ORAL
Qty: 180 TABLET | Refills: 1 | Status: SHIPPED | OUTPATIENT
Start: 2024-04-26

## 2024-04-26 RX ORDER — MODIFIED LANOLIN
1 OINTMENT (GRAM) TOPICAL PRN
Qty: 1 EACH | Refills: 0 | Status: SHIPPED
Start: 2024-04-26

## 2024-04-26 RX ADMIN — ACETAMINOPHEN 1000 MG: 500 TABLET ORAL at 13:37

## 2024-04-26 RX ADMIN — ACETAMINOPHEN 1000 MG: 500 TABLET ORAL at 01:34

## 2024-04-26 RX ADMIN — IBUPROFEN 800 MG: 400 TABLET, FILM COATED ORAL at 04:38

## 2024-04-26 RX ADMIN — IBUPROFEN 800 MG: 400 TABLET, FILM COATED ORAL at 13:37

## 2024-04-26 ASSESSMENT — PAIN DESCRIPTION - DESCRIPTORS: DESCRIPTORS: CRAMPING

## 2024-04-26 ASSESSMENT — PAIN DESCRIPTION - ORIENTATION: ORIENTATION: LOWER

## 2024-04-26 ASSESSMENT — PAIN DESCRIPTION - LOCATION: LOCATION: ABDOMEN

## 2024-04-26 ASSESSMENT — PAIN SCALES - GENERAL: PAINLEVEL_OUTOF10: 6

## 2024-04-26 NOTE — PROGRESS NOTES
History & Physical    Name: Judi Diehl MRN: [id number]  SSN: [id number]    YOB: 1995  Age: 28 y.o.  Sex: female        Subjective:     Estimated Date of Delivery: 5/3/24  OB History          6    Para   5    Term   5       0    AB   1    Living   5         SAB   1    IAB        Ectopic        Molar        Multiple   0    Live Births   1                Ms. Diehl is admitted with pregnancy at 38w5d for  PROM  . Prenatal course was normal. Please see prenatal records for details.    Patient Active Problem List    Diagnosis Date Noted    PROM (premature rupture of membranes) 2024    Vaginal delivery 2024    9 weeks gestation of pregnancy 10/02/2023     Preferred name: Judi  Partner name: Barbara    Provider: SOILA  Collaborative provider: MARIYA  Collaborative appointment: MARIYA FELIZ  EDC by LMP cw US  Pertinents:  1. Third baby with NIGEL  2. Lives in [place]  3. SAB  has anxiety over this pregnancy due to loss  4. Last 3 deliveries 37 weeks spontaneous  5. Anemia, 2/7 start iron  6. MATTEO 25.9 , EFW 58%- repeat scan in 4 weeks   7. IOL- 24 @ 39 weeks- LGA baby and lives in [place] - hx of 37-38 week deliveries with last 3 births     COVID + in pregnancy:  Covid vaccinated:  Baby ASA: NA    Teaching checklist  First Tri handout: JLP  Second Tri Handout:  KK  Third Tri handout: / BR    IOB labs: AB+, antibody screen neg, HIV neg, Rubella immune, HepB&C neg, 11.3/35.9, VZV immune, Tpal NR  Panorama: Low risk female  AFP/MSAFP:  Third trimester labs: 9.9/30.6  GTT: 144, needs 3 hour ___passed 83, 128, 94,102  Rhogam:  GBS: Neg    Anatomy:  MFM Consult:     Flu: declines  TDAP: declines    Pain mgmt. in labor: plans epidural  Feeding: Breast  Breast pump rx:  Pediatrician:  Circ: girl  Social-  J[place] Jim[place]  Children-   - Barbara  Daugther- Margarita  Son- Brett (SANDRO)  Son- Ld (with L-A NIGEL)  Son- Yassine (With L-A  NIGEL)  Occupation - SAHM          Amniotic fluid leaking 03/02/2021    37 weeks gestation of pregnancy 03/02/2021       Past Medical History:   Diagnosis Date    Anemia     Asthma     Postpartum depression     depression and anxiety     Past Surgical History:   Procedure Laterality Date    OTHER SURGICAL HISTORY  2011    10 ear surgeries, tubes, regraft ear drum    TYMPANOSTOMY TUBE PLACEMENT       Social History     Socioeconomic History    Marital status:      Spouse name: None    Number of children: None    Years of education: 14yrs    Highest education level: None   Tobacco Use    Smoking status: Never    Smokeless tobacco: Never   Substance and Sexual Activity    Alcohol use: No    Drug use: No    Sexual activity: Yes     Birth control/protection: Pill     Social Determinants of Health     Food Insecurity: No Food Insecurity (4/24/2024)    Hunger Vital Sign     Worried About Running Out of Food in the Last Year: Never true     Ran Out of Food in the Last Year: Never true   Transportation Needs: No Transportation Needs (4/24/2024)    PRAPARE - Transportation     Lack of Transportation (Medical): No     Lack of Transportation (Non-Medical): No   Housing Stability: Low Risk  (4/24/2024)    Housing Stability Vital Sign     Unable to Pay for Housing in the Last Year: No     Number of Places Lived in the Last Year: 1     Unstable Housing in the Last Year: No     Family History   Problem Relation Age of Onset    No Known Problems Father     Other Sister         Heart condition- 'hole' in heart    Colon Cancer Maternal Grandmother         Diagnosed in 60's    No Known Problems Mother        Allergies   Allergen Reactions    Ceftin [Cefuroxime] Hives    Cefuroxime Axetil Hives     Prior to Admission medications    Medication Sig Start Date End Date Taking? Authorizing Provider   ferrous sulfate (IRON 325) 325 (65 Fe) MG tablet Take 1 tablet by mouth 2 times daily  Patient not taking: Reported on 4/24/2024 2/7/24

## 2024-04-26 NOTE — DISCHARGE SUMMARY
Date: 04/24/2024  Value: 0.0         Ref range: 0.0 - 0.1 K/UL     Status: Final  Immature Granulocytes Absolute                Date: 04/24/2024  Value: 0.0         Ref range: 0.00 - 0.04 K/UL   Status: Final  Differential Type                             Date: 04/24/2024  Value: AUTOMATED   Ref range:                    Status: Final  RPR                                           Date: 04/24/2024  Value: NONREACTIVE Ref range: NR                 Status: Final  Crossmatch expiration date                    Date: 04/24/2024  Value: 04/27/2024,2359                       Status: Final  ABO/Rh                                        Date: 04/24/2024  Value: AB POSITIVE   Status: Final  Antibody Screen                               Date: 04/24/2024  Value: NEG           Status: Final  T. Pallidum (Syphilis) Antibody, E*           Date: 10/09/2023  Value: non reactive                       Status: Final  HIV, External Result                          Date: 10/09/2023  Value: non reactive                       Status: Final  Hep B, External Result                        Date: 10/09/2023  Value: negative      Status: Final  Rubella Titer, External Result                Date: 10/09/2023  Value: immune        Status: Final  GBS, External Result                          Date: 04/10/2024  Value: negative      Status: Final  ------------    Radiology last 7 days:  No results found.     [unfilled]    Discharge Medications    Current Discharge Medication List    START taking these medications    ibuprofen (ADVIL;MOTRIN) 800 MG tablet  Take 1 tablet by mouth every 8 hours  Qty: 120 tablet Refills: 3    lansinoh lanolin CREA ointment  Apply 1 Application topically as needed for Dry Skin (nipple discomfort)  Qty: 1 each Refills: 0    docusate sodium (COLACE, DULCOLAX) 100 MG CAPS  Take 100 mg by mouth daily  Qty: 90 capsule Refills: 1          Current Discharge Medication List    CONTINUE these medications which have  CHANGED    ferrous sulfate (IRON 325) 325 (65 Fe) MG tablet  Take 1 tablet by mouth daily (with breakfast)  Qty: 180 tablet Refills: 1          Current Discharge Medication List    CONTINUE these medications which have NOT CHANGED    Prenatal Vit-Fe Fumarate-FA (PRENATAL PO)  Take by mouth          Current Discharge Medication List    STOP taking these medications    ondansetron (ZOFRAN) 4 MG tablet  Comments:  Reason for Stopping:          Time Spent on Discharge:  30 minutes were spent in patient examination, evaluation, counseling as well as medication reconciliation, prescriptions for required medications, discharge plan, and follow up.    Electronically signed by SOURAV Salguero CNM on 4/26/24 at 8:21 AM EDT

## 2024-04-26 NOTE — PROGRESS NOTES
Post-Partum Day Number 2 Progress/Discharge Note    Patient doing well post-partum without significant complaint.  She is voiding without difficulty, she reports normal lochia. She is ambulatiing without dizziness.  Her pain is well controlled with oral pain medication. She is tolerating general diet.    Vitals:  Patient Vitals for the past 8 hrs:   BP Temp Temp src Pulse Resp SpO2   24 0430 120/73 98.1 °F (36.7 °C) Oral 71 16 97 %     Temp (24hrs), Av.9 °F (36.6 °C), Min:97.6 °F (36.4 °C), Max:98.2 °F (36.8 °C)        Exam:  Patient without distress.                              Abdomen soft, fundus firm at level of umbilicus, nontender               Lower extremities are negative for cords or tenderness; no swelling.    Labs: No results found for this or any previous visit (from the past 24 hour(s)).    No components found for: \"OBEXTABORH\", \"OBEXTABSCRN\", \"OBEXTRUBELLA\", \"OBEXTGRBS\", \"OBEXTHBSAG\", \"OBEXTHIV\", \"OBEXTRPR\", \"OBEXTGONORR\", \"OBEXTCHLAM\"    Assessment and Plan:    Postpartum Day #2, S/P .  Doing well.   - d/c home   - F/U 6wk postpartum check, sooner prn

## 2024-04-26 NOTE — LACTATION NOTE
This note was copied from a baby's chart.  Not seen at breast, mother declines LC consult, states that baby needed bottles of formula to maintain blood sugars, and now will not take her breast.  Mother is not concerned about this and is planning to just pump, which is what she did with her son.  Mother states one son did learn to latch and nursed for over one year.  I encouraged her that baby may be more willing to latch and nurse when her milk transitions.  Mother has no further questions for lactation consultant before discharge.

## 2024-04-30 ENCOUNTER — TELEPHONE (OUTPATIENT)
Facility: HOSPITAL | Age: 29
End: 2024-04-30

## 2024-04-30 NOTE — TELEPHONE ENCOUNTER
Patient states she hs been pumping and giving the baby her own breast milk. Baby is is not latching to her engorged breasts. She said she has had a headache since yesterday. No vision changes or epigastric pain. She has some swelling in her feet. She has notified her midwife and will reach out again if the headache doesn't go away or worsens. No other concerns at this time.

## 2024-05-11 ENCOUNTER — TELEPHONE (OUTPATIENT)
Facility: HOSPITAL | Age: 29
End: 2024-05-11

## 2024-05-28 ENCOUNTER — TELEPHONE (OUTPATIENT)
Age: 29
End: 2024-05-28

## 2024-05-28 NOTE — TELEPHONE ENCOUNTER
Patient called in, name and  verified. Patient is calling as she spoke with LA over the weekend and reports that LA wanted her seen ASAP int he office for her frequent headaches with a CBC and CMP along with a BP check. She was also advised by LA over the weekend to go to Urgent care. She reports she has not had the time to go yet. She has been offered an appt this afternoon with the midwife in the office but reports she has too many appts for her  kids today. She wants to be fit in sometime this week other than today but the schedule is booked. Please let me know if there is a spot you wouldn't mind me overbooking.

## 2024-05-29 ENCOUNTER — OFFICE VISIT (OUTPATIENT)
Age: 29
End: 2024-05-29
Payer: COMMERCIAL

## 2024-05-29 VITALS — BODY MASS INDEX: 32.48 KG/M2 | WEIGHT: 189.2 LBS | SYSTOLIC BLOOD PRESSURE: 124 MMHG | DIASTOLIC BLOOD PRESSURE: 86 MMHG

## 2024-05-29 PROCEDURE — 99212 OFFICE O/P EST SF 10 MIN: CPT

## 2024-05-29 RX ORDER — SERTRALINE HYDROCHLORIDE 25 MG/1
25 TABLET, FILM COATED ORAL DAILY
Qty: 30 TABLET | Refills: 1 | Status: SHIPPED | OUTPATIENT
Start: 2024-05-29

## 2024-05-29 NOTE — PROGRESS NOTES
Judi Diehl is a 28 y.o. female who complains of  persistent headaches for three weeks. She denies nausea, vomiting, RUQ pain, epigastric pain, changes in vision or mentum. Her blood pressures is 124/86 today. She notes she is tired and she is irritable managing five children.     Her relevant past medical history:   Past Medical History:   Diagnosis Date    Anemia     Asthma     Postpartum depression     depression and anxiety        Past Surgical History:   Procedure Laterality Date    OTHER SURGICAL HISTORY  2011    10 ear surgeries, tubes, regraft ear drum    TYMPANOSTOMY TUBE PLACEMENT       Social History     Occupational History    Not on file   Tobacco Use    Smoking status: Never    Smokeless tobacco: Never   Substance and Sexual Activity    Alcohol use: No    Drug use: No    Sexual activity: Yes     Birth control/protection: Pill     Family History   Problem Relation Age of Onset    No Known Problems Father     Other Sister         Heart condition- 'hole' in heart    Colon Cancer Maternal Grandmother         Diagnosed in 60's    No Known Problems Mother        Allergies   Allergen Reactions    Ceftin [Cefuroxime] Hives    Cefuroxime Axetil Hives     Prior to Admission medications    Medication Sig Start Date End Date Taking? Authorizing Provider   Prenatal Vit-Fe Fumarate-FA (PRENATAL PO) Take by mouth   Yes Provider, MD Bianca   ferrous sulfate (IRON 325) 325 (65 Fe) MG tablet Take 1 tablet by mouth daily (with breakfast)  Patient not taking: Reported on 5/29/2024 4/26/24   Amelia Castanon APRN - CNM   ibuprofen (ADVIL;MOTRIN) 800 MG tablet Take 1 tablet by mouth every 8 hours  Patient not taking: Reported on 5/29/2024 4/26/24   Amelia Castanon APRN - CNM   lansinoh lanolin CREA ointment Apply 1 Application topically as needed for Dry Skin (nipple discomfort)  Patient not taking: Reported on 5/29/2024 4/26/24   Amelia Castanon APRN - CNM   docusate sodium (COLACE, DULCOLAX) 100 MG CAPS

## 2024-05-29 NOTE — PROGRESS NOTES
No chief complaint on file.      Ob/Gyn Hx:    LMP - No LMP recorded.  Menses - Varies   Contraception - none. Planning Vasectomy for partner  SA - Not currently    Health Maintenance:  Last Pap:     1. Have you been to the ER, urgent care clinic, or hospitalized since your last visit?BS-Childbirth    2. Have you seen or consulted any other health care providers outside of the [facility] since your last visit? No    Patient declines chaperone.    Fam De La Cruz, YOUSIFN

## 2024-05-30 LAB
ALBUMIN SERPL-MCNC: 3.6 G/DL (ref 3.5–5)
ALBUMIN/GLOB SERPL: 1 (ref 1.1–2.2)
ALP SERPL-CCNC: 103 U/L (ref 45–117)
ALT SERPL-CCNC: 21 U/L (ref 12–78)
ANION GAP SERPL CALC-SCNC: 6 MMOL/L (ref 5–15)
AST SERPL-CCNC: 16 U/L (ref 15–37)
BILIRUB SERPL-MCNC: 0.8 MG/DL (ref 0.2–1)
BUN SERPL-MCNC: 10 MG/DL (ref 6–20)
BUN/CREAT SERPL: 15 (ref 12–20)
CALCIUM SERPL-MCNC: 9.2 MG/DL (ref 8.5–10.1)
CHLORIDE SERPL-SCNC: 108 MMOL/L (ref 97–108)
CO2 SERPL-SCNC: 26 MMOL/L (ref 21–32)
CREAT SERPL-MCNC: 0.68 MG/DL (ref 0.55–1.02)
ERYTHROCYTE [DISTWIDTH] IN BLOOD BY AUTOMATED COUNT: 18.6 % (ref 11.5–14.5)
GLOBULIN SER CALC-MCNC: 3.5 G/DL (ref 2–4)
GLUCOSE SERPL-MCNC: 100 MG/DL (ref 65–100)
HCT VFR BLD AUTO: 34 % (ref 35–47)
HGB BLD-MCNC: 9.9 G/DL (ref 11.5–16)
MCH RBC QN AUTO: 23.2 PG (ref 26–34)
MCHC RBC AUTO-ENTMCNC: 29.1 G/DL (ref 30–36.5)
MCV RBC AUTO: 79.6 FL (ref 80–99)
NRBC # BLD: 0 K/UL (ref 0–0.01)
NRBC BLD-RTO: 0 PER 100 WBC
PLATELET # BLD AUTO: 359 K/UL (ref 150–400)
PMV BLD AUTO: 11.6 FL (ref 8.9–12.9)
POTASSIUM SERPL-SCNC: 3.8 MMOL/L (ref 3.5–5.1)
PROT SERPL-MCNC: 7.1 G/DL (ref 6.4–8.2)
RBC # BLD AUTO: 4.27 M/UL (ref 3.8–5.2)
SODIUM SERPL-SCNC: 140 MMOL/L (ref 136–145)
WBC # BLD AUTO: 5.1 K/UL (ref 3.6–11)

## 2024-06-12 ENCOUNTER — POSTPARTUM VISIT (OUTPATIENT)
Age: 29
End: 2024-06-12

## 2024-06-12 VITALS
SYSTOLIC BLOOD PRESSURE: 124 MMHG | BODY MASS INDEX: 31.58 KG/M2 | WEIGHT: 185 LBS | DIASTOLIC BLOOD PRESSURE: 80 MMHG | HEIGHT: 64 IN

## 2024-06-12 PROCEDURE — 0503F POSTPARTUM CARE VISIT: CPT | Performed by: ADVANCED PRACTICE MIDWIFE

## 2024-06-12 NOTE — PROGRESS NOTES
No chief complaint on file.      Judi Diehl is a 28 y.o. female returns for a routine post-partum follow-up visit. She delivered on 2024 by Kimberly Tavares CNM.  Type of delivery: normal spontaneous vaginal delivery    Problems: none    Postpartum Depression: Low Risk  (2024)    [place]  Depression Scale     Last EPDS Total Score: 3     Last EPDS Self Harm Result: Never       Breastfeeding: Pumping - baby difficulty w/ latching  Bleeding Resolved: light spotting  Reviewed birth control options. Declines.  Last Pap: 2022 WNL      1. Have you been to the ER, urgent care clinic, or hospitalized since your last visit? No    2. Have you seen or consulted any other health care providers outside of the [facility] since your last visit? No    She declines  a chaperone during the gynecologic exam today.      Katherine Watts MA  
124/80   Ht 1.626 m (5' 4\")   Wt 83.9 kg (185 lb)   LMP 07/28/2023 (Exact Date)   Breastfeeding Yes   BMI 31.76 kg/m²       OBGyn Exam      Constitutional  Appearance: well-nourished, well developed, alert, in no acute distress    HENT  Head and Face: appears normal    Neck  Inspection/Palpation: normal appearance, no masses or tenderness  Thyroid: gland size normal, nontender    Chest  Respiratory Effort: non-labored breathing    Cardiovascular  Extremities: no peripheral edema    Gastrointestinal  Abdominal Examination: abdomen non-distended, non-tender to palpation, no masses present  Liver and spleen: no hepatomegaly present, spleen not palpable  Hernias: no hernias identified  Rectus diastasis - 1 FB    Genitourinary  Declines     Skin  General Inspection: no rash, no lesions identified    Neurologic/Psychiatric  Mental Status:  Orientation: grossly oriented to person, place and time  Mood and Affect: mood normal, affect appropriate      Assessment/Plan:      Postpartum  Doing well postpartum.  No sign of PP depression or anxiety.   Breastfeeding.  Contraception -   Okay to return to normal activities (exercise, bath, intercourse).   RTO 3-6 months for annual or sooner as needed    2. Rectus Diastasis  - normal activity     EV BARTHOLOMEW, APRN - CNM

## 2024-06-18 NOTE — TELEPHONE ENCOUNTER
Newly delivered pt with mastitis appropriate empiric treatment with follow up apt.    EV BARTHOLOMEW, SOURAV - SHADEM

## 2024-06-20 RX ORDER — SERTRALINE HYDROCHLORIDE 25 MG/1
25 TABLET, FILM COATED ORAL DAILY
Qty: 90 TABLET | Refills: 1 | Status: SHIPPED | OUTPATIENT
Start: 2024-06-20

## 2024-10-29 ENCOUNTER — OFFICE VISIT (OUTPATIENT)
Age: 29
End: 2024-10-29
Payer: COMMERCIAL

## 2024-10-29 VITALS
DIASTOLIC BLOOD PRESSURE: 80 MMHG | HEART RATE: 90 BPM | HEIGHT: 64 IN | WEIGHT: 192.6 LBS | RESPIRATION RATE: 16 BRPM | TEMPERATURE: 98 F | SYSTOLIC BLOOD PRESSURE: 122 MMHG | OXYGEN SATURATION: 99 % | BODY MASS INDEX: 32.88 KG/M2

## 2024-10-29 DIAGNOSIS — Z01.419 WELL WOMAN EXAM: Primary | ICD-10-CM

## 2024-10-29 PROCEDURE — 99395 PREV VISIT EST AGE 18-39: CPT | Performed by: ADVANCED PRACTICE MIDWIFE

## 2024-10-29 ASSESSMENT — PATIENT HEALTH QUESTIONNAIRE - PHQ9
SUM OF ALL RESPONSES TO PHQ QUESTIONS 1-9: 2
2. FEELING DOWN, DEPRESSED OR HOPELESS: SEVERAL DAYS
SUM OF ALL RESPONSES TO PHQ9 QUESTIONS 1 & 2: 2
1. LITTLE INTEREST OR PLEASURE IN DOING THINGS: SEVERAL DAYS
SUM OF ALL RESPONSES TO PHQ QUESTIONS 1-9: 2

## 2024-10-29 NOTE — PROGRESS NOTES
Judi Diehl is a 29 y.o. female returns for an annual exam     Chief Complaint   Patient presents with    Annual Exam       Patient's last menstrual period was 10/13/2024 (approximate).  Her periods are moderate in flow and usually regular with a 26-32 day interval with 3-7 day duration.  She does not have dysmenorrhea.  Problems: no problems  Birth Control: none.  Last Pap: normal obtained 2 year(s) ago.  She does not have a history of LARA 2, 3 or cervical cancer.   With regard to the Gardisil vaccine, she is unsure if she has had the series.      1. Have you been to the ER, urgent care clinic, or hospitalized since your last visit? No    2. Have you seen or consulted any other health care providers outside of the [facility] since your last visit? No    Examination chaperoned by Fam De La Cruz LPN.

## 2024-10-30 NOTE — PROGRESS NOTES
Annual exam    Judi Diehl is a 29 y.o. female returns for an annual exam -  Pt is breast feeding her 6 month old daughter         Chief Complaint   Patient presents with    Annual Exam         Patient's last menstrual period was 10/13/2024 (approximate).  Her periods are moderate in flow and usually regular with a 26-32 day interval with 3-7 day duration.  She does not have dysmenorrhea.  Problems: no problems  Birth Control: none.  Last Pap: normal obtained 2 year(s) ago.  She does not have a history of LARA 2, 3 or cervical cancer.   With regard to the Gardisil vaccine, she is unsure if she has had the series.         Past Medical History:   Diagnosis Date    Anemia     Asthma     Postpartum depression     depression and anxiety       Past Surgical History:   Procedure Laterality Date    OTHER SURGICAL HISTORY  2011    10 ear surgeries, tubes, regraft ear drum    TYMPANOSTOMY TUBE PLACEMENT         Family History   Problem Relation Age of Onset    No Known Problems Father     Other Sister         Heart condition- 'hole' in heart    Colon Cancer Maternal Grandmother         Diagnosed in 60's    No Known Problems Mother        Social History     Socioeconomic History    Marital status:      Spouse name: Not on file    Number of children: Not on file    Years of education: 14yrs    Highest education level: Not on file   Occupational History    Not on file   Tobacco Use    Smoking status: Never    Smokeless tobacco: Never   Substance and Sexual Activity    Alcohol use: No    Drug use: No    Sexual activity: Yes     Birth control/protection: Pill   Other Topics Concern    Not on file   Social History Narrative    Not on file     Social Determinants of Health     Financial Resource Strain: Low Risk  (11/2/2018)    Received from [facility]  (prior to 6/17/2023), [facility]  (prior to 6/17/2023)    Overall Financial Resource Strain (CARDIA)     Difficulty of Paying Living

## 2025-01-18 ENCOUNTER — TELEPHONE (OUTPATIENT)
Age: 30
End: 2025-01-18

## 2025-01-18 RX ORDER — PNV NO.95/FERROUS FUM/FOLIC AC 28MG-0.8MG
1 TABLET ORAL DAILY
Qty: 90 TABLET | Refills: 11 | Status: SHIPPED | OUTPATIENT
Start: 2025-01-18 | End: 2025-05-07